# Patient Record
Sex: MALE | Race: WHITE | Employment: FULL TIME
[De-identification: names, ages, dates, MRNs, and addresses within clinical notes are randomized per-mention and may not be internally consistent; named-entity substitution may affect disease eponyms.]

---

## 2017-06-13 ENCOUNTER — TELEPHONE (OUTPATIENT)
Dept: OTHER | Facility: CLINIC | Age: 52
End: 2017-06-13

## 2018-06-07 ENCOUNTER — OFFICE VISIT (OUTPATIENT)
Dept: FAMILY MEDICINE CLINIC | Age: 53
End: 2018-06-07

## 2018-06-07 ENCOUNTER — TELEPHONE (OUTPATIENT)
Dept: FAMILY MEDICINE CLINIC | Age: 53
End: 2018-06-07

## 2018-06-07 VITALS
DIASTOLIC BLOOD PRESSURE: 82 MMHG | SYSTOLIC BLOOD PRESSURE: 120 MMHG | OXYGEN SATURATION: 98 % | WEIGHT: 241.4 LBS | BODY MASS INDEX: 33.8 KG/M2 | HEIGHT: 71 IN | HEART RATE: 88 BPM

## 2018-06-07 DIAGNOSIS — R07.9 CHEST PAIN, UNSPECIFIED TYPE: Primary | ICD-10-CM

## 2018-06-07 DIAGNOSIS — Z00.00 WELLNESS EXAMINATION: ICD-10-CM

## 2018-06-07 DIAGNOSIS — G47.33 OSA ON CPAP: ICD-10-CM

## 2018-06-07 DIAGNOSIS — Z99.89 OSA ON CPAP: ICD-10-CM

## 2018-06-07 DIAGNOSIS — R42 DIZZINESS: ICD-10-CM

## 2018-06-07 DIAGNOSIS — E78.00 ELEVATED LDL CHOLESTEROL LEVEL: Primary | ICD-10-CM

## 2018-06-07 PROCEDURE — 99213 OFFICE O/P EST LOW 20 MIN: CPT | Performed by: FAMILY MEDICINE

## 2018-06-07 ASSESSMENT — ENCOUNTER SYMPTOMS
DIARRHEA: 0
SHORTNESS OF BREATH: 0
ABDOMINAL PAIN: 0
CHEST TIGHTNESS: 0
BLOOD IN STOOL: 0
CONSTIPATION: 0
COLOR CHANGE: 0

## 2018-06-07 ASSESSMENT — PATIENT HEALTH QUESTIONNAIRE - PHQ9
SUM OF ALL RESPONSES TO PHQ9 QUESTIONS 1 & 2: 0
SUM OF ALL RESPONSES TO PHQ QUESTIONS 1-9: 0
1. LITTLE INTEREST OR PLEASURE IN DOING THINGS: 0
2. FEELING DOWN, DEPRESSED OR HOPELESS: 0

## 2018-06-21 ENCOUNTER — HOSPITAL ENCOUNTER (OUTPATIENT)
Dept: NON INVASIVE DIAGNOSTICS | Age: 53
Discharge: HOME | End: 2018-06-21
Attending: FAMILY MEDICINE | Admitting: FAMILY MEDICINE

## 2018-06-21 ENCOUNTER — HOSPITAL ENCOUNTER (OUTPATIENT)
Dept: OTHER | Age: 53
Discharge: HOME | End: 2018-06-21
Attending: FAMILY MEDICINE | Admitting: FAMILY MEDICINE

## 2018-06-21 VITALS
DIASTOLIC BLOOD PRESSURE: 70 MMHG | WEIGHT: 245 LBS | HEIGHT: 71 IN | BODY MASS INDEX: 34.3 KG/M2 | SYSTOLIC BLOOD PRESSURE: 110 MMHG | HEART RATE: 74 BPM

## 2018-06-21 DIAGNOSIS — E78.00 ELEVATED LDL CHOLESTEROL LEVEL: ICD-10-CM

## 2018-06-21 DIAGNOSIS — Z00.00 WELLNESS EXAMINATION: ICD-10-CM

## 2018-06-21 DIAGNOSIS — R07.9 CHEST PAIN: ICD-10-CM

## 2018-06-21 LAB
A/G RATIO: 1.4 (ref 1.1–2.2)
ALBUMIN SERPL-MCNC: 4.2 G/DL (ref 3.4–5)
ALP BLD-CCNC: 42 U/L (ref 40–129)
ALT SERPL-CCNC: 17 U/L (ref 10–40)
ANION GAP SERPL CALCULATED.3IONS-SCNC: 10 MMOL/L (ref 3–16)
AST SERPL-CCNC: 15 U/L (ref 15–37)
BILIRUB SERPL-MCNC: 0.7 MG/DL (ref 0–1)
BUN BLDV-MCNC: 12 MG/DL (ref 7–20)
CALCIUM SERPL-MCNC: 9 MG/DL (ref 8.3–10.6)
CHLORIDE BLD-SCNC: 105 MMOL/L (ref 99–110)
CHOLESTEROL, TOTAL: 153 MG/DL (ref 0–199)
CO2: 27 MMOL/L (ref 21–32)
CREAT SERPL-MCNC: 0.9 MG/DL (ref 0.9–1.3)
GFR AFRICAN AMERICAN: >60
GFR NON-AFRICAN AMERICAN: >60
GLOBULIN: 2.9 G/DL
GLUCOSE BLD-MCNC: 97 MG/DL (ref 70–99)
HDLC SERPL-MCNC: 41 MG/DL (ref 40–60)
LDL CHOLESTEROL CALCULATED: 97 MG/DL
LV EF: 76 %
LVEF MODALITY: NORMAL
POTASSIUM SERPL-SCNC: 4.5 MMOL/L (ref 3.5–5.1)
PROSTATE SPECIFIC ANTIGEN: 0.53 NG/ML (ref 0–4)
SODIUM BLD-SCNC: 142 MMOL/L (ref 136–145)
TOTAL PROTEIN: 7.1 G/DL (ref 6.4–8.2)
TRIGL SERPL-MCNC: 76 MG/DL (ref 0–150)
VLDLC SERPL CALC-MCNC: 15 MG/DL

## 2018-06-21 ASSESSMENT — PAIN - FUNCTIONAL ASSESSMENT: PAIN_FUNCTIONAL_ASSESSMENT: 0-10

## 2018-06-22 DIAGNOSIS — R94.39 ABNORMAL STRESS TEST: Primary | ICD-10-CM

## 2018-06-27 ENCOUNTER — OFFICE VISIT (OUTPATIENT)
Dept: FAMILY MEDICINE CLINIC | Age: 53
End: 2018-06-27

## 2018-06-27 VITALS
HEIGHT: 71 IN | HEART RATE: 90 BPM | DIASTOLIC BLOOD PRESSURE: 84 MMHG | BODY MASS INDEX: 33.77 KG/M2 | SYSTOLIC BLOOD PRESSURE: 122 MMHG | OXYGEN SATURATION: 98 % | WEIGHT: 241.2 LBS

## 2018-06-27 DIAGNOSIS — Z00.00 WELLNESS EXAMINATION: Primary | ICD-10-CM

## 2018-06-27 DIAGNOSIS — Z12.11 ENCOUNTER FOR FIT (FECAL IMMUNOCHEMICAL TEST) SCREENING: ICD-10-CM

## 2018-06-27 PROCEDURE — 99396 PREV VISIT EST AGE 40-64: CPT | Performed by: FAMILY MEDICINE

## 2018-06-27 ASSESSMENT — ENCOUNTER SYMPTOMS
COLOR CHANGE: 0
CONSTIPATION: 0
ABDOMINAL PAIN: 0
CHEST TIGHTNESS: 0
SHORTNESS OF BREATH: 0
BLOOD IN STOOL: 0
DIARRHEA: 0

## 2018-07-20 ENCOUNTER — TELEPHONE (OUTPATIENT)
Dept: FAMILY MEDICINE CLINIC | Age: 53
End: 2018-07-20

## 2018-07-20 RX ORDER — FLUCONAZOLE 150 MG/1
TABLET ORAL
Qty: 3 TABLET | Refills: 0 | Status: SHIPPED | OUTPATIENT
Start: 2018-07-20 | End: 2019-01-07 | Stop reason: ALTCHOICE

## 2019-01-07 ENCOUNTER — OFFICE VISIT (OUTPATIENT)
Dept: FAMILY MEDICINE CLINIC | Age: 54
End: 2019-01-07
Payer: COMMERCIAL

## 2019-01-07 VITALS
DIASTOLIC BLOOD PRESSURE: 80 MMHG | SYSTOLIC BLOOD PRESSURE: 110 MMHG | BODY MASS INDEX: 33.46 KG/M2 | HEIGHT: 71 IN | OXYGEN SATURATION: 98 % | WEIGHT: 239 LBS | HEART RATE: 72 BPM

## 2019-01-07 DIAGNOSIS — H92.01 RIGHT EAR PAIN: Primary | ICD-10-CM

## 2019-01-07 DIAGNOSIS — R42 DIZZINESS: ICD-10-CM

## 2019-01-07 PROCEDURE — 99214 OFFICE O/P EST MOD 30 MIN: CPT | Performed by: FAMILY MEDICINE

## 2019-01-07 RX ORDER — AMOXICILLIN 875 MG/1
875 TABLET, COATED ORAL 2 TIMES DAILY
Qty: 20 TABLET | Refills: 0 | Status: SHIPPED | OUTPATIENT
Start: 2019-01-07 | End: 2020-01-16 | Stop reason: SDUPTHER

## 2019-01-07 ASSESSMENT — ENCOUNTER SYMPTOMS
SINUS PRESSURE: 0
WHEEZING: 0
SORE THROAT: 0
COUGH: 0
VOMITING: 0
RHINORRHEA: 0
NAUSEA: 0
DIARRHEA: 0
SHORTNESS OF BREATH: 0

## 2019-04-18 ENCOUNTER — OFFICE VISIT (OUTPATIENT)
Dept: FAMILY MEDICINE CLINIC | Age: 54
End: 2019-04-18
Payer: COMMERCIAL

## 2019-04-18 VITALS
OXYGEN SATURATION: 96 % | HEIGHT: 71 IN | HEART RATE: 68 BPM | WEIGHT: 238.4 LBS | DIASTOLIC BLOOD PRESSURE: 96 MMHG | SYSTOLIC BLOOD PRESSURE: 148 MMHG | BODY MASS INDEX: 33.38 KG/M2

## 2019-04-18 DIAGNOSIS — R03.0 ELEVATED BLOOD PRESSURE READING: Primary | ICD-10-CM

## 2019-04-18 DIAGNOSIS — G47.33 OSA ON CPAP: ICD-10-CM

## 2019-04-18 DIAGNOSIS — R53.83 OTHER FATIGUE: ICD-10-CM

## 2019-04-18 DIAGNOSIS — Z99.89 OSA ON CPAP: ICD-10-CM

## 2019-04-18 PROCEDURE — 99214 OFFICE O/P EST MOD 30 MIN: CPT | Performed by: FAMILY MEDICINE

## 2019-04-18 RX ORDER — SILDENAFIL CITRATE 20 MG/1
100 TABLET ORAL DAILY PRN
Qty: 30 TABLET | Refills: 0 | Status: ON HOLD | OUTPATIENT
Start: 2019-04-18 | End: 2019-09-28

## 2019-04-18 ASSESSMENT — ENCOUNTER SYMPTOMS
CHEST TIGHTNESS: 0
CONSTIPATION: 0
DIARRHEA: 0
NAUSEA: 1
BLOOD IN STOOL: 0
SHORTNESS OF BREATH: 0

## 2019-04-18 NOTE — PROGRESS NOTES
Chief Complaint   Patient presents with    Fatigue    Pressure Behind the Eyes       HPI:  Asya Ley is a 47 y.o. (: 1965) here today   for   HPI  Ear ache for the last month on right side, now in left side. Has tried amoxicillin w/o much relief. Pressure in his head and increased fatigue. Has some shaking and face has been getting extremely red. Does not get much sleep, gets up at 4am and doesn't go to bed until 10 or so. Does not eat good food choices, he thinks possibly his sugar or bp. Had episode years ago of his blood sugar being down to 50, has also been on bp meds   Ear sxs off and on approx 1 mo. Some improvement w/ covering it. Worse over past few days. Has been using cpap, but overall getting less sleep. Feels cpap working well. Feels worse if does not use it. After eating, often feels he needs to [facility]. \"    Typically eats 4 donuts in the am.  approx 9 am, will eat a snack (star crunch, biscuit w/ tenderloin today). Trying to minimize soda. turkey, cheese later. Tried to avoid carbs this afternoon. Felt a little better. Ate some chips, then salami, then more chips this afternoon. Felt better this afternoon. Will occas need to eat something if shaky. Sig family hx of dm. Has been under sig amt of stress. Patient's medications, allergies, past medical, surgical, social and family histories were reviewed and updated as appropriate. ROS:  Review of Systems   Constitutional: Positive for appetite change and fatigue. Negative for chills and fever. HENT: Positive for ear pain. Respiratory: Negative for chest tightness and shortness of breath. Cardiovascular: Negative for chest pain and palpitations. Gastrointestinal: Positive for nausea. Negative for blood in stool, constipation and diarrhea. Neurological: Positive for dizziness, weakness, light-headedness and headaches.            Prior to Visit Medications    Not on File       No Known

## 2019-04-18 NOTE — PATIENT INSTRUCTIONS
Hypertension Lifestyle Interventions - What Good Does It Do? Intervention Systolic BP Reduction Diastolic BP Reduction        DASH Diet Plus Sodium Restriction 11.5 5.7   Sodium Restriction (<1500mg/day) 7 3   DASH Diet 5.5 3   Weight Loss (Approx.  9 lbs.) 4.5 3.2   Exercise (150 min/wk) 4 3   Restrictions of Alcohol Consumption 3 2

## 2019-04-20 LAB
A/G RATIO: 1 (ref 1–2.5)
ALBUMIN SERPL-MCNC: 3.8 G/DL (ref 3.6–5)
ALP BLD-CCNC: 49 U/L (ref 46–116)
ALT SERPL-CCNC: 27 U/L (ref 12–78)
ANION GAP SERPL CALCULATED.3IONS-SCNC: 10.7 MMOL/L (ref 8–16)
AST SERPL-CCNC: 17 U/L (ref 12–36)
BASOPHILS RELATIVE PERCENT: 0.9 % (ref 0–1)
BILIRUB SERPL-MCNC: 0.7 MG/DL (ref 0–1.1)
BUN BLDV-MCNC: 15 MG/DL (ref 5–19)
CALCIUM SERPL-MCNC: 8.8 MG/DL (ref 8.4–10.2)
CHLORIDE BLD-SCNC: 106 MMOL/L (ref 99–111)
CO2: 30 MMOL/L (ref 21–33)
CREAT SERPL-MCNC: 1.1 MG/DL (ref 0.6–1.3)
EOSINOPHILS RELATIVE PERCENT: 0.9 % (ref 0–5)
ERYTHROCYTE DISTRIBUTION WIDTH RBC RATIO: 13.3 % (ref 11.6–14.8)
GFR CALCULATED: 70
GLOBULIN: 3.7 G/DL (ref 2–3.5)
GLUCOSE BLD-MCNC: 96 MG/DL (ref 74–99)
GRANULOCYTE ABSOLUTE COUNT: 4.4 X(10)3/UL (ref 1.8–7.2)
HCT VFR BLD CALC: 46.9 % (ref 37.4–53.8)
HEMOGLOBIN: 15.8 G/DL (ref 13.2–16.5)
IMMATURE GRANULOCYTES %: 0.3 % (ref 0–0.5)
LYMPHOCYTES ABSOLUTE: 2 X(10)3/UL (ref 1.1–2.7)
LYMPHOCYTES RELATIVE PERCENT: 28.5 % (ref 15–45)
MCH RBC QN AUTO: 30.5 PG (ref 27.7–33.3)
MCHC RBC AUTO-ENTMCNC: 33.7 G/DL (ref 32.8–35)
MCV RBC AUTO: 90.5 FL (ref 80.5–96.9)
MONOCYTES RELATIVE PERCENT: 6.7 % (ref 0–12)
NEUTROPHILS/100 LEUKOCYTES: 62.7 % (ref 40–70)
PLATELETS: 188 X1000 (ref 129–332)
POTASSIUM SERPL-SCNC: 4.3 MMOL/L (ref 3.4–5)
RBC # BLD: 5.18 X1000000 (ref 4.16–5.62)
SODIUM BLD-SCNC: 142 MMOL/L (ref 136–146)
TOTAL PROTEIN: 7.5 G/DL (ref 6.3–8)
VITAMIN B-12: 571 PG/ML (ref 193–986)
WBC # BLD: 7 X1000 (ref 5.4–9.9)

## 2019-04-22 LAB
ESTIMATED AVERAGE GLUCOSE: 114 MG/DL (ref 74–99)
HBA1C MFR BLD: 5.6 % (ref 4.3–6.1)
T4 FREE: 0.93 NG/DL (ref 0.75–1.54)
TSH, 3RD GENERATION: 1.18 UIU/ML (ref 0.6–4.8)

## 2019-09-28 ENCOUNTER — APPOINTMENT (OUTPATIENT)
Dept: CT IMAGING | Age: 54
DRG: 440 | End: 2019-09-28
Payer: COMMERCIAL

## 2019-09-28 ENCOUNTER — APPOINTMENT (OUTPATIENT)
Dept: ULTRASOUND IMAGING | Age: 54
DRG: 440 | End: 2019-09-28
Payer: COMMERCIAL

## 2019-09-28 ENCOUNTER — HOSPITAL ENCOUNTER (OUTPATIENT)
Age: 54
Setting detail: OBSERVATION
Discharge: HOME | DRG: 440 | End: 2019-09-29
Attending: EMERGENCY MEDICINE | Admitting: INTERNAL MEDICINE
Payer: COMMERCIAL

## 2019-09-28 DIAGNOSIS — K85.00 IDIOPATHIC ACUTE PANCREATITIS WITHOUT INFECTION OR NECROSIS: Primary | ICD-10-CM

## 2019-09-28 DIAGNOSIS — K85.10 GALL STONE PANCREATITIS: ICD-10-CM

## 2019-09-28 PROBLEM — K85.90 ACUTE PANCREATITIS: Status: ACTIVE | Noted: 2019-09-28

## 2019-09-28 LAB
A/G RATIO: 1.2 (ref 1.1–2.2)
ALBUMIN SERPL-MCNC: 4.2 G/DL (ref 3.4–5)
ALP BLD-CCNC: 44 U/L (ref 40–129)
ALT SERPL-CCNC: 12 U/L (ref 10–40)
ANION GAP SERPL CALCULATED.3IONS-SCNC: 14 MMOL/L (ref 3–16)
AST SERPL-CCNC: 15 U/L (ref 15–37)
BASOPHILS ABSOLUTE: 0 K/UL (ref 0–0.2)
BASOPHILS RELATIVE PERCENT: 0.3 %
BILIRUB SERPL-MCNC: 1.2 MG/DL (ref 0–1)
BILIRUBIN URINE: NEGATIVE
BLOOD, URINE: ABNORMAL
BUN BLDV-MCNC: 9 MG/DL (ref 7–20)
CALCIUM SERPL-MCNC: 9.5 MG/DL (ref 8.3–10.6)
CHLORIDE BLD-SCNC: 102 MMOL/L (ref 99–110)
CLARITY: CLEAR
CO2: 23 MMOL/L (ref 21–32)
COLOR: YELLOW
CREAT SERPL-MCNC: 0.9 MG/DL (ref 0.9–1.3)
EOSINOPHILS ABSOLUTE: 0 K/UL (ref 0–0.6)
EOSINOPHILS RELATIVE PERCENT: 0.3 %
GFR AFRICAN AMERICAN: >60
GFR NON-AFRICAN AMERICAN: >60
GLOBULIN: 3.5 G/DL
GLUCOSE BLD-MCNC: 89 MG/DL (ref 70–99)
GLUCOSE URINE: NEGATIVE MG/DL
HCT VFR BLD CALC: 46.6 % (ref 40.5–52.5)
HEMOGLOBIN: 15.6 G/DL (ref 13.5–17.5)
KETONES, URINE: 15 MG/DL
LEUKOCYTE ESTERASE, URINE: NEGATIVE
LIPASE: 726 U/L (ref 13–60)
LYMPHOCYTES ABSOLUTE: 2 K/UL (ref 1–5.1)
LYMPHOCYTES RELATIVE PERCENT: 16.6 %
MCH RBC QN AUTO: 30.1 PG (ref 26–34)
MCHC RBC AUTO-ENTMCNC: 33.5 G/DL (ref 31–36)
MCV RBC AUTO: 89.6 FL (ref 80–100)
MICROSCOPIC EXAMINATION: YES
MONOCYTES ABSOLUTE: 0.8 K/UL (ref 0–1.3)
MONOCYTES RELATIVE PERCENT: 6.9 %
NEUTROPHILS ABSOLUTE: 9.4 K/UL (ref 1.7–7.7)
NEUTROPHILS RELATIVE PERCENT: 75.9 %
NITRITE, URINE: NEGATIVE
PDW BLD-RTO: 14 % (ref 12.4–15.4)
PH UA: 7.5 (ref 5–8)
PLATELET # BLD: 166 K/UL (ref 135–450)
PMV BLD AUTO: 9 FL (ref 5–10.5)
POTASSIUM SERPL-SCNC: 4.1 MMOL/L (ref 3.5–5.1)
PROTEIN UA: NEGATIVE MG/DL
RBC # BLD: 5.2 M/UL (ref 4.2–5.9)
RBC UA: NORMAL /HPF (ref 0–2)
SODIUM BLD-SCNC: 139 MMOL/L (ref 136–145)
SPECIFIC GRAVITY UA: 1.01 (ref 1–1.03)
TOTAL PROTEIN: 7.7 G/DL (ref 6.4–8.2)
URINE TYPE: ABNORMAL
UROBILINOGEN, URINE: 0.2 E.U./DL
WBC # BLD: 12.3 K/UL (ref 4–11)
WBC UA: NORMAL /HPF (ref 0–5)

## 2019-09-28 PROCEDURE — 6360000002 HC RX W HCPCS: Performed by: PHYSICIAN ASSISTANT

## 2019-09-28 PROCEDURE — 99285 EMERGENCY DEPT VISIT HI MDM: CPT

## 2019-09-28 PROCEDURE — 2500000003 HC RX 250 WO HCPCS: Performed by: EMERGENCY MEDICINE

## 2019-09-28 PROCEDURE — 2580000003 HC RX 258: Performed by: EMERGENCY MEDICINE

## 2019-09-28 PROCEDURE — 6360000002 HC RX W HCPCS: Performed by: EMERGENCY MEDICINE

## 2019-09-28 PROCEDURE — 6360000004 HC RX CONTRAST MEDICATION: Performed by: EMERGENCY MEDICINE

## 2019-09-28 PROCEDURE — G0378 HOSPITAL OBSERVATION PER HR: HCPCS

## 2019-09-28 PROCEDURE — 81001 URINALYSIS AUTO W/SCOPE: CPT

## 2019-09-28 PROCEDURE — 74177 CT ABD & PELVIS W/CONTRAST: CPT

## 2019-09-28 PROCEDURE — 36415 COLL VENOUS BLD VENIPUNCTURE: CPT

## 2019-09-28 PROCEDURE — 85025 COMPLETE CBC W/AUTO DIFF WBC: CPT

## 2019-09-28 PROCEDURE — 96375 TX/PRO/DX INJ NEW DRUG ADDON: CPT

## 2019-09-28 PROCEDURE — 76705 ECHO EXAM OF ABDOMEN: CPT

## 2019-09-28 PROCEDURE — 2580000003 HC RX 258: Performed by: PHYSICIAN ASSISTANT

## 2019-09-28 PROCEDURE — 96374 THER/PROPH/DIAG INJ IV PUSH: CPT

## 2019-09-28 PROCEDURE — 96372 THER/PROPH/DIAG INJ SC/IM: CPT

## 2019-09-28 PROCEDURE — 83690 ASSAY OF LIPASE: CPT

## 2019-09-28 PROCEDURE — 80053 COMPREHEN METABOLIC PANEL: CPT

## 2019-09-28 PROCEDURE — 99222 1ST HOSP IP/OBS MODERATE 55: CPT | Performed by: PHYSICIAN ASSISTANT

## 2019-09-28 PROCEDURE — 1200000000 HC SEMI PRIVATE

## 2019-09-28 RX ORDER — MORPHINE SULFATE 4 MG/ML
4 INJECTION, SOLUTION INTRAMUSCULAR; INTRAVENOUS
Status: DISCONTINUED | OUTPATIENT
Start: 2019-09-28 | End: 2019-09-29 | Stop reason: HOSPADM

## 2019-09-28 RX ORDER — SODIUM CHLORIDE 9 MG/ML
INJECTION, SOLUTION INTRAVENOUS CONTINUOUS
Status: DISCONTINUED | OUTPATIENT
Start: 2019-09-28 | End: 2019-09-29

## 2019-09-28 RX ORDER — ONDANSETRON 2 MG/ML
4 INJECTION INTRAMUSCULAR; INTRAVENOUS ONCE
Status: COMPLETED | OUTPATIENT
Start: 2019-09-28 | End: 2019-09-28

## 2019-09-28 RX ORDER — 0.9 % SODIUM CHLORIDE 0.9 %
1000 INTRAVENOUS SOLUTION INTRAVENOUS ONCE
Status: COMPLETED | OUTPATIENT
Start: 2019-09-28 | End: 2019-09-28

## 2019-09-28 RX ORDER — ONDANSETRON 2 MG/ML
4 INJECTION INTRAMUSCULAR; INTRAVENOUS EVERY 6 HOURS PRN
Status: DISCONTINUED | OUTPATIENT
Start: 2019-09-28 | End: 2019-09-29 | Stop reason: HOSPADM

## 2019-09-28 RX ORDER — SODIUM CHLORIDE 0.9 % (FLUSH) 0.9 %
10 SYRINGE (ML) INJECTION PRN
Status: DISCONTINUED | OUTPATIENT
Start: 2019-09-28 | End: 2019-09-29 | Stop reason: HOSPADM

## 2019-09-28 RX ORDER — SODIUM CHLORIDE 0.9 % (FLUSH) 0.9 %
10 SYRINGE (ML) INJECTION EVERY 12 HOURS SCHEDULED
Status: DISCONTINUED | OUTPATIENT
Start: 2019-09-28 | End: 2019-09-29 | Stop reason: HOSPADM

## 2019-09-28 RX ORDER — MORPHINE SULFATE 2 MG/ML
2 INJECTION, SOLUTION INTRAMUSCULAR; INTRAVENOUS
Status: DISCONTINUED | OUTPATIENT
Start: 2019-09-28 | End: 2019-09-29 | Stop reason: HOSPADM

## 2019-09-28 RX ORDER — ACETAMINOPHEN 325 MG/1
650 TABLET ORAL EVERY 4 HOURS PRN
Status: DISCONTINUED | OUTPATIENT
Start: 2019-09-28 | End: 2019-09-29 | Stop reason: HOSPADM

## 2019-09-28 RX ADMIN — SODIUM CHLORIDE: 9 INJECTION, SOLUTION INTRAVENOUS at 20:14

## 2019-09-28 RX ADMIN — SODIUM CHLORIDE: 9 INJECTION, SOLUTION INTRAVENOUS at 14:53

## 2019-09-28 RX ADMIN — SODIUM CHLORIDE 1000 ML: 9 INJECTION, SOLUTION INTRAVENOUS at 11:38

## 2019-09-28 RX ADMIN — ONDANSETRON 4 MG: 2 INJECTION INTRAMUSCULAR; INTRAVENOUS at 11:39

## 2019-09-28 RX ADMIN — SODIUM CHLORIDE: 9 INJECTION, SOLUTION INTRAVENOUS at 20:12

## 2019-09-28 RX ADMIN — FAMOTIDINE 20 MG: 10 INJECTION, SOLUTION INTRAVENOUS at 11:39

## 2019-09-28 RX ADMIN — Medication 10 ML: at 20:13

## 2019-09-28 RX ADMIN — ENOXAPARIN SODIUM 40 MG: 40 INJECTION SUBCUTANEOUS at 20:13

## 2019-09-28 RX ADMIN — IOPAMIDOL 75 ML: 755 INJECTION, SOLUTION INTRAVENOUS at 12:03

## 2019-09-28 ASSESSMENT — PAIN DESCRIPTION - DESCRIPTORS
DESCRIPTORS: CRAMPING
DESCRIPTORS: ACHING;DISCOMFORT

## 2019-09-28 ASSESSMENT — PAIN SCALES - GENERAL
PAINLEVEL_OUTOF10: 2
PAINLEVEL_OUTOF10: 2

## 2019-09-28 ASSESSMENT — ENCOUNTER SYMPTOMS
COUGH: 0
STRIDOR: 0
ABDOMINAL PAIN: 1
RECTAL PAIN: 0
CHEST TIGHTNESS: 0
BACK PAIN: 0
CONSTIPATION: 0
BLOOD IN STOOL: 0
ABDOMINAL DISTENTION: 0
DIARRHEA: 0
VOMITING: 0
WHEEZING: 0
NAUSEA: 0
ANAL BLEEDING: 0
SHORTNESS OF BREATH: 0

## 2019-09-28 ASSESSMENT — PAIN DESCRIPTION - ONSET
ONSET: ON-GOING
ONSET: ON-GOING

## 2019-09-28 ASSESSMENT — PAIN - FUNCTIONAL ASSESSMENT: PAIN_FUNCTIONAL_ASSESSMENT: ACTIVITIES ARE NOT PREVENTED

## 2019-09-28 ASSESSMENT — PAIN DESCRIPTION - PAIN TYPE
TYPE: ACUTE PAIN
TYPE: ACUTE PAIN

## 2019-09-28 ASSESSMENT — PAIN DESCRIPTION - PROGRESSION
CLINICAL_PROGRESSION: GRADUALLY IMPROVING
CLINICAL_PROGRESSION: NOT CHANGED

## 2019-09-28 ASSESSMENT — PAIN DESCRIPTION - FREQUENCY
FREQUENCY: CONTINUOUS
FREQUENCY: INTERMITTENT

## 2019-09-28 ASSESSMENT — PAIN DESCRIPTION - LOCATION
LOCATION: ABDOMEN
LOCATION: ABDOMEN

## 2019-09-28 ASSESSMENT — PAIN DESCRIPTION - ORIENTATION
ORIENTATION: LOWER
ORIENTATION: MID;LOWER

## 2019-09-29 VITALS
RESPIRATION RATE: 16 BRPM | HEART RATE: 77 BPM | TEMPERATURE: 97 F | OXYGEN SATURATION: 99 % | DIASTOLIC BLOOD PRESSURE: 61 MMHG | SYSTOLIC BLOOD PRESSURE: 104 MMHG | BODY MASS INDEX: 33.76 KG/M2 | HEIGHT: 70 IN | WEIGHT: 235.8 LBS

## 2019-09-29 LAB
A/G RATIO: 1.2 (ref 1.1–2.2)
ALBUMIN SERPL-MCNC: 3.8 G/DL (ref 3.4–5)
ALP BLD-CCNC: 42 U/L (ref 40–129)
ALT SERPL-CCNC: 11 U/L (ref 10–40)
ANION GAP SERPL CALCULATED.3IONS-SCNC: 11 MMOL/L (ref 3–16)
AST SERPL-CCNC: 13 U/L (ref 15–37)
BASOPHILS ABSOLUTE: 0 K/UL (ref 0–0.2)
BASOPHILS RELATIVE PERCENT: 0.4 %
BILIRUB SERPL-MCNC: 1.6 MG/DL (ref 0–1)
BUN BLDV-MCNC: 10 MG/DL (ref 7–20)
CALCIUM SERPL-MCNC: 8.7 MG/DL (ref 8.3–10.6)
CHLORIDE BLD-SCNC: 100 MMOL/L (ref 99–110)
CHOLESTEROL, TOTAL: 142 MG/DL (ref 0–199)
CO2: 23 MMOL/L (ref 21–32)
CREAT SERPL-MCNC: 0.8 MG/DL (ref 0.9–1.3)
EOSINOPHILS ABSOLUTE: 0.1 K/UL (ref 0–0.6)
EOSINOPHILS RELATIVE PERCENT: 0.7 %
GFR AFRICAN AMERICAN: >60
GFR NON-AFRICAN AMERICAN: >60
GLOBULIN: 3.3 G/DL
GLUCOSE BLD-MCNC: 81 MG/DL (ref 70–99)
HCT VFR BLD CALC: 43.5 % (ref 40.5–52.5)
HDLC SERPL-MCNC: 43 MG/DL (ref 40–60)
HEMOGLOBIN: 14.7 G/DL (ref 13.5–17.5)
LDL CHOLESTEROL CALCULATED: 85 MG/DL
LYMPHOCYTES ABSOLUTE: 2 K/UL (ref 1–5.1)
LYMPHOCYTES RELATIVE PERCENT: 18.4 %
MCH RBC QN AUTO: 30.6 PG (ref 26–34)
MCHC RBC AUTO-ENTMCNC: 33.9 G/DL (ref 31–36)
MCV RBC AUTO: 90.4 FL (ref 80–100)
MONOCYTES ABSOLUTE: 0.8 K/UL (ref 0–1.3)
MONOCYTES RELATIVE PERCENT: 7.1 %
NEUTROPHILS ABSOLUTE: 7.8 K/UL (ref 1.7–7.7)
NEUTROPHILS RELATIVE PERCENT: 73.4 %
PDW BLD-RTO: 13.9 % (ref 12.4–15.4)
PLATELET # BLD: 158 K/UL (ref 135–450)
PMV BLD AUTO: 9.4 FL (ref 5–10.5)
POTASSIUM REFLEX MAGNESIUM: 4 MMOL/L (ref 3.5–5.1)
RBC # BLD: 4.81 M/UL (ref 4.2–5.9)
SODIUM BLD-SCNC: 134 MMOL/L (ref 136–145)
TOTAL PROTEIN: 7.1 G/DL (ref 6.4–8.2)
TRIGL SERPL-MCNC: 68 MG/DL (ref 0–150)
VLDLC SERPL CALC-MCNC: 14 MG/DL
WBC # BLD: 10.7 K/UL (ref 4–11)

## 2019-09-29 PROCEDURE — 80053 COMPREHEN METABOLIC PANEL: CPT

## 2019-09-29 PROCEDURE — 6360000002 HC RX W HCPCS: Performed by: PHYSICIAN ASSISTANT

## 2019-09-29 PROCEDURE — 96372 THER/PROPH/DIAG INJ SC/IM: CPT

## 2019-09-29 PROCEDURE — 99238 HOSP IP/OBS DSCHRG MGMT 30/<: CPT | Performed by: INTERNAL MEDICINE

## 2019-09-29 PROCEDURE — G0378 HOSPITAL OBSERVATION PER HR: HCPCS

## 2019-09-29 PROCEDURE — 2580000003 HC RX 258: Performed by: PHYSICIAN ASSISTANT

## 2019-09-29 PROCEDURE — 80061 LIPID PANEL: CPT

## 2019-09-29 PROCEDURE — 36415 COLL VENOUS BLD VENIPUNCTURE: CPT

## 2019-09-29 PROCEDURE — 85025 COMPLETE CBC W/AUTO DIFF WBC: CPT

## 2019-09-29 RX ORDER — ONDANSETRON 4 MG/1
4 TABLET, ORALLY DISINTEGRATING ORAL EVERY 8 HOURS PRN
Qty: 20 TABLET | Refills: 0 | Status: SHIPPED | OUTPATIENT
Start: 2019-09-29 | End: 2019-10-04 | Stop reason: ALTCHOICE

## 2019-09-29 RX ADMIN — SODIUM CHLORIDE: 9 INJECTION, SOLUTION INTRAVENOUS at 09:23

## 2019-09-29 RX ADMIN — Medication 10 ML: at 09:21

## 2019-09-29 RX ADMIN — ENOXAPARIN SODIUM 40 MG: 40 INJECTION SUBCUTANEOUS at 09:20

## 2019-09-30 ENCOUNTER — TELEPHONE (OUTPATIENT)
Dept: FAMILY MEDICINE CLINIC | Age: 54
End: 2019-09-30

## 2019-10-02 ENCOUNTER — PREP FOR PROCEDURE (OUTPATIENT)
Dept: SURGERY | Age: 54
End: 2019-10-02

## 2019-10-02 ENCOUNTER — OFFICE VISIT (OUTPATIENT)
Dept: FAMILY MEDICINE CLINIC | Age: 54
End: 2019-10-02
Payer: COMMERCIAL

## 2019-10-02 ENCOUNTER — OFFICE VISIT (OUTPATIENT)
Dept: SURGERY | Age: 54
End: 2019-10-02
Payer: COMMERCIAL

## 2019-10-02 VITALS
BODY MASS INDEX: 33.26 KG/M2 | SYSTOLIC BLOOD PRESSURE: 126 MMHG | DIASTOLIC BLOOD PRESSURE: 78 MMHG | OXYGEN SATURATION: 98 % | HEART RATE: 65 BPM | WEIGHT: 231.8 LBS

## 2019-10-02 VITALS
HEIGHT: 70 IN | DIASTOLIC BLOOD PRESSURE: 72 MMHG | BODY MASS INDEX: 33.64 KG/M2 | SYSTOLIC BLOOD PRESSURE: 126 MMHG | WEIGHT: 235 LBS

## 2019-10-02 DIAGNOSIS — K85.10 GALL STONE PANCREATITIS: Primary | ICD-10-CM

## 2019-10-02 DIAGNOSIS — Z12.11 SCREEN FOR COLON CANCER: ICD-10-CM

## 2019-10-02 DIAGNOSIS — K81.9 CHOLECYSTITIS: Primary | ICD-10-CM

## 2019-10-02 PROCEDURE — 99214 OFFICE O/P EST MOD 30 MIN: CPT | Performed by: SURGERY

## 2019-10-02 PROCEDURE — 99214 OFFICE O/P EST MOD 30 MIN: CPT | Performed by: NURSE PRACTITIONER

## 2019-10-02 PROCEDURE — 1111F DSCHRG MED/CURRENT MED MERGE: CPT | Performed by: NURSE PRACTITIONER

## 2019-10-02 RX ORDER — HEPARIN SODIUM 5000 [USP'U]/ML
5000 INJECTION, SOLUTION INTRAVENOUS; SUBCUTANEOUS ONCE
Status: CANCELLED | OUTPATIENT
Start: 2019-10-02

## 2019-10-02 RX ORDER — SODIUM CHLORIDE 0.9 % (FLUSH) 0.9 %
10 SYRINGE (ML) INJECTION EVERY 12 HOURS SCHEDULED
Status: CANCELLED | OUTPATIENT
Start: 2019-10-02

## 2019-10-02 RX ORDER — SODIUM CHLORIDE 0.9 % (FLUSH) 0.9 %
10 SYRINGE (ML) INJECTION PRN
Status: CANCELLED | OUTPATIENT
Start: 2019-10-02

## 2019-10-02 ASSESSMENT — ENCOUNTER SYMPTOMS
EYE PAIN: 0
EYE ITCHING: 0
STRIDOR: 0
VOMITING: 0
BACK PAIN: 0
PHOTOPHOBIA: 0
CHOKING: 0
COLOR CHANGE: 0
WHEEZING: 0
EYE REDNESS: 0
SHORTNESS OF BREATH: 0
SORE THROAT: 0
CONSTIPATION: 0
SINUS PRESSURE: 0
BLOOD IN STOOL: 0
DIARRHEA: 0
VOICE CHANGE: 0
CHEST TIGHTNESS: 0
COUGH: 0
NAUSEA: 0
RHINORRHEA: 0
TROUBLE SWALLOWING: 0
EYE DISCHARGE: 0
SINUS PAIN: 0
ABDOMINAL PAIN: 0

## 2019-10-02 ASSESSMENT — PATIENT HEALTH QUESTIONNAIRE - PHQ9
2. FEELING DOWN, DEPRESSED OR HOPELESS: 0
1. LITTLE INTEREST OR PLEASURE IN DOING THINGS: 0
SUM OF ALL RESPONSES TO PHQ QUESTIONS 1-9: 0
SUM OF ALL RESPONSES TO PHQ9 QUESTIONS 1 & 2: 0
SUM OF ALL RESPONSES TO PHQ QUESTIONS 1-9: 0

## 2019-10-08 ENCOUNTER — ANESTHESIA EVENT (OUTPATIENT)
Dept: OPERATING ROOM | Age: 54
End: 2019-10-08
Payer: COMMERCIAL

## 2019-10-09 ENCOUNTER — APPOINTMENT (OUTPATIENT)
Dept: GENERAL RADIOLOGY | Age: 54
End: 2019-10-09
Attending: SURGERY
Payer: COMMERCIAL

## 2019-10-09 ENCOUNTER — HOSPITAL ENCOUNTER (OUTPATIENT)
Age: 54
Setting detail: OUTPATIENT SURGERY
Discharge: HOME | End: 2019-10-09
Attending: SURGERY | Admitting: SURGERY
Payer: COMMERCIAL

## 2019-10-09 ENCOUNTER — ANESTHESIA (OUTPATIENT)
Dept: OPERATING ROOM | Age: 54
End: 2019-10-09
Payer: COMMERCIAL

## 2019-10-09 VITALS
OXYGEN SATURATION: 99 % | WEIGHT: 225 LBS | SYSTOLIC BLOOD PRESSURE: 116 MMHG | BODY MASS INDEX: 31.5 KG/M2 | DIASTOLIC BLOOD PRESSURE: 75 MMHG | RESPIRATION RATE: 11 BRPM | TEMPERATURE: 97.9 F | HEIGHT: 71 IN | HEART RATE: 65 BPM

## 2019-10-09 VITALS
OXYGEN SATURATION: 99 % | RESPIRATION RATE: 2 BRPM | SYSTOLIC BLOOD PRESSURE: 121 MMHG | DIASTOLIC BLOOD PRESSURE: 86 MMHG | TEMPERATURE: 97.7 F

## 2019-10-09 DIAGNOSIS — K85.10 GALLSTONE PANCREATITIS: Primary | ICD-10-CM

## 2019-10-09 LAB
ALBUMIN SERPL-MCNC: 4.2 G/DL (ref 3.4–5)
ALP BLD-CCNC: 45 U/L (ref 40–129)
ALT SERPL-CCNC: 16 U/L (ref 10–40)
AST SERPL-CCNC: 13 U/L (ref 15–37)
BILIRUB SERPL-MCNC: 0.6 MG/DL (ref 0–1)
BILIRUBIN DIRECT: <0.2 MG/DL (ref 0–0.3)
BILIRUBIN, INDIRECT: ABNORMAL MG/DL (ref 0–1)
TOTAL PROTEIN: 7.6 G/DL (ref 6.4–8.2)

## 2019-10-09 PROCEDURE — 93005 ELECTROCARDIOGRAM TRACING: CPT | Performed by: ANESTHESIOLOGY

## 2019-10-09 PROCEDURE — 47563 LAPARO CHOLECYSTECTOMY/GRAPH: CPT | Performed by: SURGERY

## 2019-10-09 PROCEDURE — 7100000001 HC PACU RECOVERY - ADDTL 15 MIN: Performed by: SURGERY

## 2019-10-09 PROCEDURE — 80076 HEPATIC FUNCTION PANEL: CPT

## 2019-10-09 PROCEDURE — 6360000002 HC RX W HCPCS: Performed by: ANESTHESIOLOGY

## 2019-10-09 PROCEDURE — 7100000010 HC PHASE II RECOVERY - FIRST 15 MIN: Performed by: SURGERY

## 2019-10-09 PROCEDURE — 88304 TISSUE EXAM BY PATHOLOGIST: CPT

## 2019-10-09 PROCEDURE — 6370000000 HC RX 637 (ALT 250 FOR IP): Performed by: ANESTHESIOLOGY

## 2019-10-09 PROCEDURE — 3600000014 HC SURGERY LEVEL 4 ADDTL 15MIN: Performed by: SURGERY

## 2019-10-09 PROCEDURE — 2580000003 HC RX 258: Performed by: SURGERY

## 2019-10-09 PROCEDURE — 6360000004 HC RX CONTRAST MEDICATION: Performed by: SURGERY

## 2019-10-09 PROCEDURE — 2500000003 HC RX 250 WO HCPCS: Performed by: NURSE ANESTHETIST, CERTIFIED REGISTERED

## 2019-10-09 PROCEDURE — 7100000011 HC PHASE II RECOVERY - ADDTL 15 MIN: Performed by: SURGERY

## 2019-10-09 PROCEDURE — 6360000002 HC RX W HCPCS: Performed by: SURGERY

## 2019-10-09 PROCEDURE — 2500000003 HC RX 250 WO HCPCS: Performed by: ANESTHESIOLOGY

## 2019-10-09 PROCEDURE — 2500000003 HC RX 250 WO HCPCS: Performed by: SURGERY

## 2019-10-09 PROCEDURE — 2720000010 HC SURG SUPPLY STERILE: Performed by: SURGERY

## 2019-10-09 PROCEDURE — 2580000003 HC RX 258: Performed by: ANESTHESIOLOGY

## 2019-10-09 PROCEDURE — 3600000004 HC SURGERY LEVEL 4 BASE: Performed by: SURGERY

## 2019-10-09 PROCEDURE — 6360000002 HC RX W HCPCS: Performed by: NURSE ANESTHETIST, CERTIFIED REGISTERED

## 2019-10-09 PROCEDURE — 2709999900 HC NON-CHARGEABLE SUPPLY: Performed by: SURGERY

## 2019-10-09 PROCEDURE — 3700000000 HC ANESTHESIA ATTENDED CARE: Performed by: SURGERY

## 2019-10-09 PROCEDURE — 36415 COLL VENOUS BLD VENIPUNCTURE: CPT

## 2019-10-09 PROCEDURE — 7100000000 HC PACU RECOVERY - FIRST 15 MIN: Performed by: SURGERY

## 2019-10-09 PROCEDURE — 3700000001 HC ADD 15 MINUTES (ANESTHESIA): Performed by: SURGERY

## 2019-10-09 PROCEDURE — 74300 X-RAY BILE DUCTS/PANCREAS: CPT

## 2019-10-09 RX ORDER — SODIUM CHLORIDE, SODIUM LACTATE, POTASSIUM CHLORIDE, AND CALCIUM CHLORIDE .6; .31; .03; .02 G/100ML; G/100ML; G/100ML; G/100ML
IRRIGANT IRRIGATION PRN
Status: DISCONTINUED | OUTPATIENT
Start: 2019-10-09 | End: 2019-10-09 | Stop reason: ALTCHOICE

## 2019-10-09 RX ORDER — FENTANYL CITRATE 50 UG/ML
INJECTION, SOLUTION INTRAMUSCULAR; INTRAVENOUS PRN
Status: DISCONTINUED | OUTPATIENT
Start: 2019-10-09 | End: 2019-10-09 | Stop reason: SDUPTHER

## 2019-10-09 RX ORDER — LABETALOL HYDROCHLORIDE 5 MG/ML
5 INJECTION, SOLUTION INTRAVENOUS EVERY 10 MIN PRN
Status: DISCONTINUED | OUTPATIENT
Start: 2019-10-09 | End: 2019-10-09 | Stop reason: HOSPADM

## 2019-10-09 RX ORDER — SODIUM CHLORIDE 0.9 % (FLUSH) 0.9 %
10 SYRINGE (ML) INJECTION PRN
Status: DISCONTINUED | OUTPATIENT
Start: 2019-10-09 | End: 2019-10-09 | Stop reason: SDUPTHER

## 2019-10-09 RX ORDER — HYDRALAZINE HYDROCHLORIDE 20 MG/ML
5 INJECTION INTRAMUSCULAR; INTRAVENOUS EVERY 10 MIN PRN
Status: DISCONTINUED | OUTPATIENT
Start: 2019-10-09 | End: 2019-10-09 | Stop reason: HOSPADM

## 2019-10-09 RX ORDER — BUPIVACAINE HYDROCHLORIDE 5 MG/ML
INJECTION, SOLUTION EPIDURAL; INTRACAUDAL PRN
Status: DISCONTINUED | OUTPATIENT
Start: 2019-10-09 | End: 2019-10-09 | Stop reason: ALTCHOICE

## 2019-10-09 RX ORDER — SODIUM CHLORIDE, SODIUM LACTATE, POTASSIUM CHLORIDE, CALCIUM CHLORIDE 600; 310; 30; 20 MG/100ML; MG/100ML; MG/100ML; MG/100ML
INJECTION, SOLUTION INTRAVENOUS CONTINUOUS
Status: DISCONTINUED | OUTPATIENT
Start: 2019-10-09 | End: 2019-10-09 | Stop reason: HOSPADM

## 2019-10-09 RX ORDER — DEXAMETHASONE SODIUM PHOSPHATE 4 MG/ML
INJECTION, SOLUTION INTRA-ARTICULAR; INTRALESIONAL; INTRAMUSCULAR; INTRAVENOUS; SOFT TISSUE PRN
Status: DISCONTINUED | OUTPATIENT
Start: 2019-10-09 | End: 2019-10-09 | Stop reason: SDUPTHER

## 2019-10-09 RX ORDER — APREPITANT 40 MG/1
40 CAPSULE ORAL ONCE
Status: COMPLETED | OUTPATIENT
Start: 2019-10-09 | End: 2019-10-09

## 2019-10-09 RX ORDER — DIPHENHYDRAMINE HYDROCHLORIDE 50 MG/ML
12.5 INJECTION INTRAMUSCULAR; INTRAVENOUS
Status: DISCONTINUED | OUTPATIENT
Start: 2019-10-09 | End: 2019-10-09 | Stop reason: HOSPADM

## 2019-10-09 RX ORDER — SODIUM CHLORIDE 0.9 % (FLUSH) 0.9 %
10 SYRINGE (ML) INJECTION PRN
Status: DISCONTINUED | OUTPATIENT
Start: 2019-10-09 | End: 2019-10-09 | Stop reason: HOSPADM

## 2019-10-09 RX ORDER — PROMETHAZINE HYDROCHLORIDE 25 MG/ML
6.25 INJECTION, SOLUTION INTRAMUSCULAR; INTRAVENOUS
Status: DISCONTINUED | OUTPATIENT
Start: 2019-10-09 | End: 2019-10-09 | Stop reason: HOSPADM

## 2019-10-09 RX ORDER — MORPHINE SULFATE 10 MG/ML
2 INJECTION, SOLUTION INTRAMUSCULAR; INTRAVENOUS EVERY 5 MIN PRN
Status: DISCONTINUED | OUTPATIENT
Start: 2019-10-09 | End: 2019-10-09 | Stop reason: HOSPADM

## 2019-10-09 RX ORDER — OXYCODONE HYDROCHLORIDE AND ACETAMINOPHEN 5; 325 MG/1; MG/1
2 TABLET ORAL PRN
Status: COMPLETED | OUTPATIENT
Start: 2019-10-09 | End: 2019-10-09

## 2019-10-09 RX ORDER — OXYCODONE HYDROCHLORIDE 5 MG/1
5 TABLET ORAL EVERY 6 HOURS PRN
Qty: 28 TABLET | Refills: 0 | Status: SHIPPED | OUTPATIENT
Start: 2019-10-09 | End: 2019-10-16

## 2019-10-09 RX ORDER — HEPARIN SODIUM 5000 [USP'U]/ML
5000 INJECTION, SOLUTION INTRAVENOUS; SUBCUTANEOUS ONCE
Status: COMPLETED | OUTPATIENT
Start: 2019-10-09 | End: 2019-10-09

## 2019-10-09 RX ORDER — SODIUM CHLORIDE 0.9 % (FLUSH) 0.9 %
10 SYRINGE (ML) INJECTION EVERY 12 HOURS SCHEDULED
Status: DISCONTINUED | OUTPATIENT
Start: 2019-10-09 | End: 2019-10-09 | Stop reason: SDUPTHER

## 2019-10-09 RX ORDER — LIDOCAINE HYDROCHLORIDE 10 MG/ML
1 INJECTION, SOLUTION EPIDURAL; INFILTRATION; INTRACAUDAL; PERINEURAL
Status: COMPLETED | OUTPATIENT
Start: 2019-10-09 | End: 2019-10-09

## 2019-10-09 RX ORDER — ONDANSETRON 2 MG/ML
INJECTION INTRAMUSCULAR; INTRAVENOUS PRN
Status: DISCONTINUED | OUTPATIENT
Start: 2019-10-09 | End: 2019-10-09 | Stop reason: SDUPTHER

## 2019-10-09 RX ORDER — LIDOCAINE HYDROCHLORIDE 20 MG/ML
INJECTION, SOLUTION INFILTRATION; PERINEURAL PRN
Status: DISCONTINUED | OUTPATIENT
Start: 2019-10-09 | End: 2019-10-09 | Stop reason: SDUPTHER

## 2019-10-09 RX ORDER — SUCCINYLCHOLINE CHLORIDE 20 MG/ML
INJECTION INTRAMUSCULAR; INTRAVENOUS PRN
Status: DISCONTINUED | OUTPATIENT
Start: 2019-10-09 | End: 2019-10-09 | Stop reason: SDUPTHER

## 2019-10-09 RX ORDER — ROCURONIUM BROMIDE 10 MG/ML
INJECTION, SOLUTION INTRAVENOUS PRN
Status: DISCONTINUED | OUTPATIENT
Start: 2019-10-09 | End: 2019-10-09 | Stop reason: SDUPTHER

## 2019-10-09 RX ORDER — MORPHINE SULFATE 10 MG/ML
1 INJECTION, SOLUTION INTRAMUSCULAR; INTRAVENOUS EVERY 5 MIN PRN
Status: DISCONTINUED | OUTPATIENT
Start: 2019-10-09 | End: 2019-10-09 | Stop reason: HOSPADM

## 2019-10-09 RX ORDER — MIDAZOLAM HYDROCHLORIDE 1 MG/ML
INJECTION INTRAMUSCULAR; INTRAVENOUS PRN
Status: DISCONTINUED | OUTPATIENT
Start: 2019-10-09 | End: 2019-10-09 | Stop reason: SDUPTHER

## 2019-10-09 RX ORDER — OXYCODONE HYDROCHLORIDE AND ACETAMINOPHEN 5; 325 MG/1; MG/1
1 TABLET ORAL PRN
Status: COMPLETED | OUTPATIENT
Start: 2019-10-09 | End: 2019-10-09

## 2019-10-09 RX ORDER — SODIUM CHLORIDE 0.9 % (FLUSH) 0.9 %
10 SYRINGE (ML) INJECTION EVERY 12 HOURS SCHEDULED
Status: DISCONTINUED | OUTPATIENT
Start: 2019-10-09 | End: 2019-10-09 | Stop reason: HOSPADM

## 2019-10-09 RX ORDER — PROPOFOL 10 MG/ML
INJECTION, EMULSION INTRAVENOUS PRN
Status: DISCONTINUED | OUTPATIENT
Start: 2019-10-09 | End: 2019-10-09 | Stop reason: SDUPTHER

## 2019-10-09 RX ORDER — ONDANSETRON 2 MG/ML
4 INJECTION INTRAMUSCULAR; INTRAVENOUS PRN
Status: DISCONTINUED | OUTPATIENT
Start: 2019-10-09 | End: 2019-10-09 | Stop reason: HOSPADM

## 2019-10-09 RX ADMIN — DEXAMETHASONE SODIUM PHOSPHATE 8 MG: 4 INJECTION, SOLUTION INTRAMUSCULAR; INTRAVENOUS at 09:02

## 2019-10-09 RX ADMIN — HEPARIN SODIUM 5000 UNITS: 5000 INJECTION INTRAVENOUS; SUBCUTANEOUS at 07:44

## 2019-10-09 RX ADMIN — OXYCODONE HYDROCHLORIDE AND ACETAMINOPHEN 1 TABLET: 5; 325 TABLET ORAL at 11:00

## 2019-10-09 RX ADMIN — APREPITANT 40 MG: 40 CAPSULE ORAL at 07:49

## 2019-10-09 RX ADMIN — SODIUM CHLORIDE, POTASSIUM CHLORIDE, SODIUM LACTATE AND CALCIUM CHLORIDE: 600; 310; 30; 20 INJECTION, SOLUTION INTRAVENOUS at 07:49

## 2019-10-09 RX ADMIN — SUGAMMADEX 200 MG: 100 INJECTION, SOLUTION INTRAVENOUS at 09:37

## 2019-10-09 RX ADMIN — Medication 2 G: at 08:49

## 2019-10-09 RX ADMIN — SODIUM CHLORIDE, POTASSIUM CHLORIDE, SODIUM LACTATE AND CALCIUM CHLORIDE: 600; 310; 30; 20 INJECTION, SOLUTION INTRAVENOUS at 07:42

## 2019-10-09 RX ADMIN — FENTANYL CITRATE 50 MCG: 50 INJECTION, SOLUTION INTRAMUSCULAR; INTRAVENOUS at 09:10

## 2019-10-09 RX ADMIN — SUCCINYLCHOLINE CHLORIDE 140 MG: 20 INJECTION, SOLUTION INTRAMUSCULAR; INTRAVENOUS at 08:57

## 2019-10-09 RX ADMIN — FENTANYL CITRATE 100 MCG: 50 INJECTION, SOLUTION INTRAMUSCULAR; INTRAVENOUS at 08:56

## 2019-10-09 RX ADMIN — ROCURONIUM BROMIDE 5 MG: 10 INJECTION, SOLUTION INTRAVENOUS at 08:57

## 2019-10-09 RX ADMIN — SUGAMMADEX 200 MG: 100 INJECTION, SOLUTION INTRAVENOUS at 09:33

## 2019-10-09 RX ADMIN — FENTANYL CITRATE 50 MCG: 50 INJECTION, SOLUTION INTRAMUSCULAR; INTRAVENOUS at 09:22

## 2019-10-09 RX ADMIN — SODIUM CHLORIDE, POTASSIUM CHLORIDE, SODIUM LACTATE AND CALCIUM CHLORIDE: 600; 310; 30; 20 INJECTION, SOLUTION INTRAVENOUS at 08:53

## 2019-10-09 RX ADMIN — LIDOCAINE HYDROCHLORIDE 1 ML: 10 INJECTION, SOLUTION EPIDURAL; INFILTRATION; INTRACAUDAL; PERINEURAL at 07:42

## 2019-10-09 RX ADMIN — LIDOCAINE HYDROCHLORIDE 80 MG: 20 INJECTION, SOLUTION INFILTRATION; PERINEURAL at 08:56

## 2019-10-09 RX ADMIN — MIDAZOLAM 2 MG: 1 INJECTION INTRAMUSCULAR; INTRAVENOUS at 08:52

## 2019-10-09 RX ADMIN — PROPOFOL 200 MG: 10 INJECTION, EMULSION INTRAVENOUS at 08:57

## 2019-10-09 RX ADMIN — ROCURONIUM BROMIDE 35 MG: 10 INJECTION, SOLUTION INTRAVENOUS at 09:02

## 2019-10-09 RX ADMIN — ONDANSETRON 4 MG: 2 INJECTION, SOLUTION INTRAMUSCULAR; INTRAVENOUS at 09:02

## 2019-10-09 ASSESSMENT — PULMONARY FUNCTION TESTS
PIF_VALUE: 21
PIF_VALUE: 24
PIF_VALUE: 3
PIF_VALUE: 26
PIF_VALUE: 0
PIF_VALUE: 15
PIF_VALUE: 1
PIF_VALUE: 15
PIF_VALUE: 26
PIF_VALUE: 2
PIF_VALUE: 4
PIF_VALUE: 21
PIF_VALUE: 26
PIF_VALUE: 23
PIF_VALUE: 1
PIF_VALUE: 26
PIF_VALUE: 24
PIF_VALUE: 19
PIF_VALUE: 2
PIF_VALUE: 20
PIF_VALUE: 17
PIF_VALUE: 17
PIF_VALUE: 18
PIF_VALUE: 1
PIF_VALUE: 21
PIF_VALUE: 24
PIF_VALUE: 16
PIF_VALUE: 1
PIF_VALUE: 35
PIF_VALUE: 21
PIF_VALUE: 19
PIF_VALUE: 19
PIF_VALUE: 23
PIF_VALUE: 3
PIF_VALUE: 18
PIF_VALUE: 27
PIF_VALUE: 6
PIF_VALUE: 17
PIF_VALUE: 26
PIF_VALUE: 2
PIF_VALUE: 26
PIF_VALUE: 1
PIF_VALUE: 20
PIF_VALUE: 26
PIF_VALUE: 21
PIF_VALUE: 17
PIF_VALUE: 24
PIF_VALUE: 24
PIF_VALUE: 0
PIF_VALUE: 26
PIF_VALUE: 19
PIF_VALUE: 20
PIF_VALUE: 18
PIF_VALUE: 26

## 2019-10-09 ASSESSMENT — PAIN DESCRIPTION - LOCATION: LOCATION: ABDOMEN

## 2019-10-09 ASSESSMENT — PAIN DESCRIPTION - ORIENTATION: ORIENTATION: UPPER

## 2019-10-09 ASSESSMENT — PAIN DESCRIPTION - PAIN TYPE: TYPE: SURGICAL PAIN

## 2019-10-09 ASSESSMENT — PAIN DESCRIPTION - DESCRIPTORS: DESCRIPTORS: PRESSURE;SORE

## 2019-10-09 ASSESSMENT — PAIN SCALES - GENERAL: PAINLEVEL_OUTOF10: 3

## 2019-10-09 ASSESSMENT — PAIN - FUNCTIONAL ASSESSMENT: PAIN_FUNCTIONAL_ASSESSMENT: 0-10

## 2019-10-10 LAB
EKG ATRIAL RATE: 61 BPM
EKG DIAGNOSIS: NORMAL
EKG P AXIS: 7 DEGREES
EKG P-R INTERVAL: 174 MS
EKG Q-T INTERVAL: 398 MS
EKG QRS DURATION: 80 MS
EKG QTC CALCULATION (BAZETT): 400 MS
EKG R AXIS: 3 DEGREES
EKG T AXIS: 31 DEGREES
EKG VENTRICULAR RATE: 61 BPM

## 2019-10-10 PROCEDURE — 93010 ELECTROCARDIOGRAM REPORT: CPT | Performed by: INTERNAL MEDICINE

## 2019-10-16 ENCOUNTER — TELEPHONE (OUTPATIENT)
Dept: SURGERY | Age: 54
End: 2019-10-16

## 2019-10-21 ENCOUNTER — OFFICE VISIT (OUTPATIENT)
Dept: SURGERY | Age: 54
End: 2019-10-21

## 2019-10-21 VITALS
BODY MASS INDEX: 32.2 KG/M2 | SYSTOLIC BLOOD PRESSURE: 126 MMHG | DIASTOLIC BLOOD PRESSURE: 76 MMHG | HEIGHT: 71 IN | WEIGHT: 230 LBS

## 2019-10-21 DIAGNOSIS — Z09 SURGICAL FOLLOW-UP CARE: Primary | ICD-10-CM

## 2019-10-21 PROCEDURE — 99024 POSTOP FOLLOW-UP VISIT: CPT | Performed by: SURGERY

## 2019-11-04 ENCOUNTER — TELEPHONE (OUTPATIENT)
Dept: FAMILY MEDICINE CLINIC | Age: 54
End: 2019-11-04

## 2019-11-04 DIAGNOSIS — Z12.11 ENCOUNTER FOR SCREENING COLONOSCOPY: Primary | ICD-10-CM

## 2019-11-13 ENCOUNTER — INITIAL CONSULT (OUTPATIENT)
Dept: GASTROENTEROLOGY | Age: 54
End: 2019-11-13
Payer: COMMERCIAL

## 2019-11-13 VITALS
WEIGHT: 230 LBS | SYSTOLIC BLOOD PRESSURE: 122 MMHG | HEIGHT: 71 IN | BODY MASS INDEX: 32.2 KG/M2 | DIASTOLIC BLOOD PRESSURE: 70 MMHG

## 2019-11-13 DIAGNOSIS — R19.5 POSITIVE COLORECTAL CANCER SCREENING USING COLOGUARD TEST: Primary | ICD-10-CM

## 2019-11-13 PROCEDURE — 99203 OFFICE O/P NEW LOW 30 MIN: CPT | Performed by: INTERNAL MEDICINE

## 2019-12-09 ENCOUNTER — HOSPITAL ENCOUNTER (OUTPATIENT)
Age: 54
Setting detail: OUTPATIENT SURGERY
Discharge: HOME | End: 2019-12-09
Attending: INTERNAL MEDICINE | Admitting: INTERNAL MEDICINE
Payer: COMMERCIAL

## 2019-12-09 VITALS
OXYGEN SATURATION: 98 % | SYSTOLIC BLOOD PRESSURE: 118 MMHG | BODY MASS INDEX: 31.5 KG/M2 | DIASTOLIC BLOOD PRESSURE: 77 MMHG | WEIGHT: 225 LBS | RESPIRATION RATE: 16 BRPM | HEIGHT: 71 IN | TEMPERATURE: 98.7 F | HEART RATE: 61 BPM

## 2019-12-09 PROBLEM — K63.5 SIGMOID POLYP: Status: ACTIVE | Noted: 2019-12-09

## 2019-12-09 PROBLEM — K63.5 POLYP OF ASCENDING COLON: Status: ACTIVE | Noted: 2019-12-09

## 2019-12-09 PROCEDURE — 99153 MOD SED SAME PHYS/QHP EA: CPT | Performed by: INTERNAL MEDICINE

## 2019-12-09 PROCEDURE — 99152 MOD SED SAME PHYS/QHP 5/>YRS: CPT | Performed by: INTERNAL MEDICINE

## 2019-12-09 PROCEDURE — 2709999900 HC NON-CHARGEABLE SUPPLY: Performed by: INTERNAL MEDICINE

## 2019-12-09 PROCEDURE — 7100000010 HC PHASE II RECOVERY - FIRST 15 MIN: Performed by: INTERNAL MEDICINE

## 2019-12-09 PROCEDURE — 88305 TISSUE EXAM BY PATHOLOGIST: CPT

## 2019-12-09 PROCEDURE — 7100000011 HC PHASE II RECOVERY - ADDTL 15 MIN: Performed by: INTERNAL MEDICINE

## 2019-12-09 PROCEDURE — 2580000003 HC RX 258: Performed by: INTERNAL MEDICINE

## 2019-12-09 PROCEDURE — 45385 COLONOSCOPY W/LESION REMOVAL: CPT | Performed by: INTERNAL MEDICINE

## 2019-12-09 PROCEDURE — 6360000002 HC RX W HCPCS: Performed by: INTERNAL MEDICINE

## 2019-12-09 PROCEDURE — 3609009900 HC COLONOSCOPY W/CONTROL BLEEDING ANY METHOD: Performed by: INTERNAL MEDICINE

## 2019-12-09 PROCEDURE — 2720000010 HC SURG SUPPLY STERILE: Performed by: INTERNAL MEDICINE

## 2019-12-09 PROCEDURE — 3609010600 HC COLONOSCOPY POLYPECTOMY SNARE/COLD BIOPSY: Performed by: INTERNAL MEDICINE

## 2019-12-09 RX ORDER — SODIUM CHLORIDE, SODIUM LACTATE, POTASSIUM CHLORIDE, CALCIUM CHLORIDE 600; 310; 30; 20 MG/100ML; MG/100ML; MG/100ML; MG/100ML
INJECTION, SOLUTION INTRAVENOUS CONTINUOUS
Status: DISCONTINUED | OUTPATIENT
Start: 2019-12-09 | End: 2019-12-09 | Stop reason: HOSPADM

## 2019-12-09 RX ORDER — FENTANYL CITRATE 50 UG/ML
INJECTION, SOLUTION INTRAMUSCULAR; INTRAVENOUS PRN
Status: DISCONTINUED | OUTPATIENT
Start: 2019-12-09 | End: 2019-12-09 | Stop reason: ALTCHOICE

## 2019-12-09 RX ORDER — SODIUM CHLORIDE, SODIUM LACTATE, POTASSIUM CHLORIDE, CALCIUM CHLORIDE 600; 310; 30; 20 MG/100ML; MG/100ML; MG/100ML; MG/100ML
INJECTION, SOLUTION INTRAVENOUS CONTINUOUS PRN
Status: COMPLETED | OUTPATIENT
Start: 2019-12-09 | End: 2019-12-09

## 2019-12-09 RX ORDER — MIDAZOLAM HYDROCHLORIDE 5 MG/ML
INJECTION INTRAMUSCULAR; INTRAVENOUS PRN
Status: DISCONTINUED | OUTPATIENT
Start: 2019-12-09 | End: 2019-12-09 | Stop reason: ALTCHOICE

## 2019-12-09 RX ADMIN — SODIUM CHLORIDE, POTASSIUM CHLORIDE, SODIUM LACTATE AND CALCIUM CHLORIDE: 600; 310; 30; 20 INJECTION, SOLUTION INTRAVENOUS at 07:02

## 2019-12-09 ASSESSMENT — PAIN SCALES - GENERAL: PAINLEVEL_OUTOF10: 0

## 2019-12-09 ASSESSMENT — PAIN - FUNCTIONAL ASSESSMENT: PAIN_FUNCTIONAL_ASSESSMENT: 0-10

## 2020-01-16 ENCOUNTER — OFFICE VISIT (OUTPATIENT)
Dept: FAMILY MEDICINE CLINIC | Age: 55
End: 2020-01-16
Payer: COMMERCIAL

## 2020-01-16 VITALS
HEIGHT: 71 IN | BODY MASS INDEX: 32.9 KG/M2 | SYSTOLIC BLOOD PRESSURE: 130 MMHG | OXYGEN SATURATION: 98 % | WEIGHT: 235 LBS | HEART RATE: 76 BPM | DIASTOLIC BLOOD PRESSURE: 72 MMHG

## 2020-01-16 PROCEDURE — 99213 OFFICE O/P EST LOW 20 MIN: CPT | Performed by: FAMILY MEDICINE

## 2020-01-16 RX ORDER — AMOXICILLIN 875 MG/1
875 TABLET, COATED ORAL 2 TIMES DAILY
Qty: 20 TABLET | Refills: 0 | Status: SHIPPED | OUTPATIENT
Start: 2020-01-16 | End: 2020-05-20 | Stop reason: SDUPTHER

## 2020-01-16 ASSESSMENT — ENCOUNTER SYMPTOMS
NAUSEA: 0
CHEST TIGHTNESS: 0
DIARRHEA: 0
SHORTNESS OF BREATH: 0
CONSTIPATION: 0
VOMITING: 0
BLOOD IN STOOL: 0

## 2020-01-16 ASSESSMENT — PATIENT HEALTH QUESTIONNAIRE - PHQ9
1. LITTLE INTEREST OR PLEASURE IN DOING THINGS: 0
SUM OF ALL RESPONSES TO PHQ9 QUESTIONS 1 & 2: 0
2. FEELING DOWN, DEPRESSED OR HOPELESS: 0
SUM OF ALL RESPONSES TO PHQ QUESTIONS 1-9: 0
SUM OF ALL RESPONSES TO PHQ QUESTIONS 1-9: 0

## 2020-01-16 NOTE — PROGRESS NOTES
Chief Complaint   Patient presents with    Dizziness       HPI:  Derrell Martinez is a 49 y.o. (: 1965) here today   for   Dizziness   This is a new problem. The current episode started 1 to 4 weeks ago. The problem occurs intermittently. The problem has been gradually worsening. Associated symptoms include fatigue. Pertinent negatives include no chest pain, chills, fever, headaches, nausea or vomiting. The symptoms are aggravated by standing (rolling over, getting up quickly. ). He has tried nothing for the symptoms. The treatment provided no relief.   worse when he lays on his right side or sets up fast. Current sxs began approx 1 mo ago. Has gotten progressively worse. Has not been doing exercise. Patient's medications, allergies, past medical, surgical, social and family histories were reviewed and updated as appropriate. ROS:  Review of Systems   Constitutional: Positive for fatigue. Negative for chills and fever. Respiratory: Negative for chest tightness and shortness of breath. Cardiovascular: Negative for chest pain and palpitations. Gastrointestinal: Negative for blood in stool, constipation, diarrhea, nausea and vomiting. Neurological: Positive for dizziness. Negative for light-headedness and headaches. Prior to Visit Medications    Medication Sig Taking? Authorizing Provider   amoxicillin (AMOXIL) 875 MG tablet Take 1 tablet by mouth 2 times daily for 10 days Yes Edinson Layton MD       No Known Allergies    OBJECTIVE:    /72   Pulse 76   Ht 5' 11\" (1.803 m)   Wt 235 lb (106.6 kg)   SpO2 98%   BMI 32.78 kg/m²     BP Readings from Last 2 Encounters:   20 130/72   19 118/77       Wt Readings from Last 3 Encounters:   20 235 lb (106.6 kg)   19 225 lb (102.1 kg)   19 230 lb (104.3 kg)        Physical Exam  Constitutional:       Appearance: He is well-developed. HENT:      Head: Normocephalic and atraumatic.       Right Ear:

## 2020-01-16 NOTE — PATIENT INSTRUCTIONS
loss of movement in your face, arm, or leg, especially on only one side of your body. ? Sudden vision changes. ? Sudden trouble speaking. ? Sudden confusion or trouble understanding simple statements. ? Sudden problems with walking or balance. ? A sudden, severe headache that is different from past headaches.    Call your doctor now or seek immediate medical care if:    · You have new or worse nausea and vomiting.     · You have new symptoms such as hearing loss or roaring in your ears.    Watch closely for changes in your health, and be sure to contact your doctor if:    · You are not getting better as expected.     · Your vertigo gets worse. Where can you learn more? Go to [email or web address].[email or web address] org and sign in to your [employer] account. Enter  in the [employer] box to learn more about \"Benign Paroxysmal Positional Vertigo (BPPV): Care Instructions. \"     If you do not have an account, please click on the \"Sign Up Now\" link. Current as of: July 28, 2019  Content Version: 12.3  © 0903-2623 [employer]. Care instructions adapted under license by [employer] ([facility]). If you have questions about a medical condition or this instruction, always ask your healthcare professional. [place] [address] any warranty or liability for your use of this information. Patient Education         Vertigo: Head Movements That Help (01:53)  Your health professional recommends that you watch this short online health video. Learn simple head movements to help with vertigo and balance problems. How to watch the video    Scan the QR code   OR Visit the website    https://[email or web address]   Current as of: July 28, 2019  Content Version: 12.3  © 0731-8928 [employer]. Care instructions adapted under license by [employer] ([facility]).  If you have questions about a medical condition or this instruction, always ask your healthcare professional. Deidre Lehman \"Epley Maneuver at Home for Vertigo: Exercises. \"     If you do not have an account, please click on the \"Sign Up Now\" link. Current as of: March 28, 2019  Content Version: 12.3  © 1986-2606 [employer]. Care instructions adapted under license by [facility] ([facility]). If you have questions about a medical condition or this instruction, always ask your healthcare professional. [address] any warranty or liability for your use of this information.

## 2020-03-02 ENCOUNTER — TELEPHONE (OUTPATIENT)
Dept: FAMILY MEDICINE CLINIC | Age: 55
End: 2020-03-02

## 2020-03-02 RX ORDER — OSELTAMIVIR PHOSPHATE 75 MG/1
75 CAPSULE ORAL DAILY
Qty: 10 CAPSULE | Refills: 0 | Status: SHIPPED | OUTPATIENT
Start: 2020-03-02 | End: 2020-03-12

## 2020-05-20 ENCOUNTER — OFFICE VISIT (OUTPATIENT)
Dept: FAMILY MEDICINE CLINIC | Age: 55
End: 2020-05-20

## 2020-05-20 VITALS
OXYGEN SATURATION: 98 % | DIASTOLIC BLOOD PRESSURE: 78 MMHG | TEMPERATURE: 97.2 F | HEIGHT: 71 IN | WEIGHT: 235.2 LBS | SYSTOLIC BLOOD PRESSURE: 118 MMHG | HEART RATE: 62 BPM | BODY MASS INDEX: 32.93 KG/M2

## 2020-05-20 PROCEDURE — 99213 OFFICE O/P EST LOW 20 MIN: CPT | Performed by: FAMILY MEDICINE

## 2020-05-20 RX ORDER — AMOXICILLIN 875 MG/1
875 TABLET, COATED ORAL 2 TIMES DAILY
Qty: 20 TABLET | Refills: 0 | Status: SHIPPED | OUTPATIENT
Start: 2020-05-20 | End: 2020-05-20

## 2020-05-20 RX ORDER — AMOXICILLIN 875 MG/1
875 TABLET, COATED ORAL 2 TIMES DAILY
Qty: 20 TABLET | Refills: 0 | Status: SHIPPED | OUTPATIENT
Start: 2020-05-20 | End: 2020-05-30

## 2020-05-20 ASSESSMENT — ENCOUNTER SYMPTOMS
SINUS PRESSURE: 1
SHORTNESS OF BREATH: 0

## 2020-05-20 NOTE — PROGRESS NOTES
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, type 1       Social History     Socioeconomic History    Marital status:      Spouse name: Not on file    Number of children: Not on file    Years of education: Not on file    Highest education level: Not on file   Occupational History    Not on file   Social Needs    Financial resource strain: Not on file    Food insecurity     Worry: Not on file     Inability: Not on file    Transportation needs     Medical: Not on file     Non-medical: Not on file   Tobacco Use    Smoking status: Never Smoker    Smokeless tobacco: Never Used   Substance and Sexual Activity    Alcohol use: No    Drug use: No    Sexual activity: Not Currently   Lifestyle    Physical activity     Days per week: Not on file     Minutes per session: Not on file    Stress: Not on file   Relationships    Social connections     Talks on phone: Not on file     Gets together: Not on file     Attends Synagogue service: Not on file     Active member of club or organization: Not on file     Attends meetings of clubs or organizations: Not on file     Relationship status: Not on file    Intimate partner violence     Fear of current or ex partner: Not on file     Emotionally abused: Not on file     Physically abused: Not on file     Forced sexual activity: Not on file   Other Topics Concern    Not on file   Social History Narrative    Not on file       Prior to Visit Medications    Medication Sig Taking?  Authorizing Provider   amoxicillin (AMOXIL) 875 MG tablet Take 1 tablet by mouth 2 times daily for 10 days Yes Claudell Pou, MD       No Known Allergies    OBJECTIVE:    /78   Pulse 62   Temp 97.2 °F (36.2 °C) (Tympanic)   Ht 5' 10.5\" (1.791 m)   Wt 235 lb 3.2 oz (106.7 kg)   SpO2 98%   BMI 33.27 kg/m²     BP Readings from Last 2 Encounters:   05/20/20 118/78   01/16/20 130/72       Wt Readings from Last 3 Encounters:   05/20/20 235 lb 3.2 oz (106.7 kg)   01/16/20 235 lb (106.6 kg)   12/09/19 225 lb (102.1 kg) Physical Exam  Constitutional:       Appearance: He is well-developed. HENT:      Head: Normocephalic and atraumatic. Right Ear: Hearing and ear canal normal. A middle ear effusion is present. Tympanic membrane is scarred. Left Ear: Hearing and ear canal normal. A middle ear effusion is present. Mouth/Throat:      Pharynx: Uvula midline. Eyes:      Conjunctiva/sclera: Conjunctivae normal.      Pupils: Pupils are equal, round, and reactive to light. Neck:      Trachea: No tracheal deviation. Cardiovascular:      Rate and Rhythm: Normal rate and regular rhythm. Heart sounds: No murmur. No friction rub. No gallop. Pulmonary:      Effort: Pulmonary effort is normal. No respiratory distress. Breath sounds: Normal breath sounds. Skin:     General: Skin is warm and dry. Findings: No rash. Neurological:      Mental Status: He is alert and oriented to person, place, and time. ASSESSMENT/PLAN:    1. Bilateral serous otitis media, unspecified chronicity  Recurrent symptoms. Antibiotics as below given current symptoms and findings on exam.  See below  - amoxicillin (AMOXIL) 875 MG tablet; Take 1 tablet by mouth 2 times daily for 10 days  Dispense: 20 tablet; Refill: 0    2. Vertigo  Recurrent symptoms from a vertigo standpoint. Symptoms seem to be worse with certain positions and movement. We did discuss ENT referral.  The patient is considering the options. He has tried exercises at home without much relief.   The patient will let us know when he is ready for any ENT referral.

## 2020-09-28 ENCOUNTER — TELEPHONE (OUTPATIENT)
Dept: FAMILY MEDICINE CLINIC | Age: 55
End: 2020-09-28

## 2020-09-28 RX ORDER — FLUCONAZOLE 150 MG/1
150 TABLET ORAL EVERY OTHER DAY
Qty: 3 TABLET | Refills: 0 | Status: SHIPPED | OUTPATIENT
Start: 2020-09-28 | End: 2020-10-03

## 2021-01-28 ENCOUNTER — OFFICE VISIT (OUTPATIENT)
Dept: FAMILY MEDICINE CLINIC | Age: 56
End: 2021-01-28
Payer: COMMERCIAL

## 2021-01-28 VITALS
BODY MASS INDEX: 34.13 KG/M2 | HEART RATE: 62 BPM | SYSTOLIC BLOOD PRESSURE: 122 MMHG | DIASTOLIC BLOOD PRESSURE: 84 MMHG | TEMPERATURE: 97 F | OXYGEN SATURATION: 96 % | HEIGHT: 71 IN | WEIGHT: 243.8 LBS

## 2021-01-28 DIAGNOSIS — N48.1 BALANITIS: Primary | ICD-10-CM

## 2021-01-28 PROCEDURE — 99213 OFFICE O/P EST LOW 20 MIN: CPT | Performed by: FAMILY MEDICINE

## 2021-01-28 RX ORDER — KETOCONAZOLE 20 MG/G
CREAM TOPICAL
Qty: 30 G | Refills: 1 | Status: SHIPPED | OUTPATIENT
Start: 2021-01-28 | End: 2022-06-29

## 2021-01-28 RX ORDER — FLUCONAZOLE 150 MG/1
150 TABLET ORAL
Qty: 5 TABLET | Refills: 0 | Status: SHIPPED | OUTPATIENT
Start: 2021-01-28 | End: 2021-02-12

## 2021-01-28 SDOH — ECONOMIC STABILITY: TRANSPORTATION INSECURITY
IN THE PAST 12 MONTHS, HAS LACK OF TRANSPORTATION KEPT YOU FROM MEETINGS, WORK, OR FROM GETTING THINGS NEEDED FOR DAILY LIVING?: NO

## 2021-01-28 SDOH — ECONOMIC STABILITY: FOOD INSECURITY: WITHIN THE PAST 12 MONTHS, THE FOOD YOU BOUGHT JUST DIDN'T LAST AND YOU DIDN'T HAVE MONEY TO GET MORE.: NEVER TRUE

## 2021-01-28 ASSESSMENT — PATIENT HEALTH QUESTIONNAIRE - PHQ9
2. FEELING DOWN, DEPRESSED OR HOPELESS: 0
1. LITTLE INTEREST OR PLEASURE IN DOING THINGS: 0
SUM OF ALL RESPONSES TO PHQ QUESTIONS 1-9: 0

## 2021-01-28 ASSESSMENT — ENCOUNTER SYMPTOMS: SHORTNESS OF BREATH: 0

## 2021-01-28 NOTE — PROGRESS NOTES
Chief Complaint   Patient presents with    Rash       HPI:  iNka Falcon is a 48 y.o. (: 1965) here today   for rash. Rash w/ burning noted and inflammation. Did improve w/ diflucan, but still w/ residual sxs. Some aggravation seemed to occur w/ underwear w/ color. Now more irritation and dryness, will peel on occas. No sig itch currently. Can depend on the time of day. Burn and itch and inflammation even w/ showering and washing. Some days worse than others. Some urinary urgency noted at times. Volume of urine seems to fluctuate. Has been trying to watch caffeine intake. Has cut down on soda. HPI    Patient's medications, allergies, past medical, surgical, social and family histories were reviewed and updated as appropriate. ROS:  Review of Systems   Constitutional: Negative for fever. Respiratory: Negative for shortness of breath. Genitourinary: Positive for frequency and urgency. Negative for difficulty urinating and dysuria. Prior to Visit Medications    Medication Sig Taking? Authorizing Provider   fluconazole (DIFLUCAN) 150 MG tablet Take 1 tablet by mouth every 3 days for 15 days Yes Letty Regan MD   ketoconazole (NIZORAL) 2 % cream Apply topically daily. Yes Letty Regna MD       No Known Allergies    OBJECTIVE:    /84   Pulse 62   Temp 97 °F (36.1 °C)   Ht 5' 10.5\" (1.791 m)   Wt 243 lb 12.8 oz (110.6 kg)   SpO2 96%   BMI 34.49 kg/m²     BP Readings from Last 2 Encounters:   21 122/84   20 118/78       Wt Readings from Last 3 Encounters:   21 243 lb 12.8 oz (110.6 kg)   20 235 lb 3.2 oz (106.7 kg)   20 235 lb (106.6 kg)       Physical Exam  Constitutional:       Appearance: Normal appearance. Eyes:      Extraocular Movements: Extraocular movements intact. Pulmonary:      Effort: Pulmonary effort is normal.   Genitourinary:     Penis: Circumcised. Erythema present.         Musculoskeletal: Right lower leg: No edema. Left lower leg: No edema. Neurological:      General: No focal deficit present. Mental Status: He is alert and oriented to person, place, and time. Psychiatric:         Mood and Affect: Mood normal.         Behavior: Behavior normal.           ASSESSMENT/PLAN:     1. Balanitis  Most likely etiology. He did get some relief with Diflucan in the past.  We will refill that as below. Given prior relief with Diflucan, trial adding a ketoconazole cream as below. Steroid cream may be an option if fails to improve  - fluconazole (DIFLUCAN) 150 MG tablet; Take 1 tablet by mouth every 3 days for 15 days  Dispense: 5 tablet; Refill: 0  - ketoconazole (NIZORAL) 2 % cream; Apply topically daily. Dispense: 30 g; Refill: 1        This document was prepared by a combination of typing and transcription through a voice recognition software.

## 2021-12-06 ENCOUNTER — OFFICE VISIT (OUTPATIENT)
Dept: FAMILY MEDICINE CLINIC | Age: 56
End: 2021-12-06
Payer: COMMERCIAL

## 2021-12-06 VITALS
WEIGHT: 233 LBS | BODY MASS INDEX: 32.96 KG/M2 | OXYGEN SATURATION: 97 % | DIASTOLIC BLOOD PRESSURE: 80 MMHG | HEART RATE: 68 BPM | TEMPERATURE: 97.4 F | SYSTOLIC BLOOD PRESSURE: 120 MMHG

## 2021-12-06 DIAGNOSIS — R10.13 EPIGASTRIC PAIN: Primary | ICD-10-CM

## 2021-12-06 PROCEDURE — G8427 DOCREV CUR MEDS BY ELIG CLIN: HCPCS

## 2021-12-06 PROCEDURE — G8417 CALC BMI ABV UP PARAM F/U: HCPCS

## 2021-12-06 PROCEDURE — 3017F COLORECTAL CA SCREEN DOC REV: CPT

## 2021-12-06 PROCEDURE — 1036F TOBACCO NON-USER: CPT

## 2021-12-06 PROCEDURE — G8484 FLU IMMUNIZE NO ADMIN: HCPCS

## 2021-12-06 PROCEDURE — 99213 OFFICE O/P EST LOW 20 MIN: CPT

## 2021-12-06 ASSESSMENT — ENCOUNTER SYMPTOMS
SHORTNESS OF BREATH: 0
COLOR CHANGE: 0
VOMITING: 0
EYE DISCHARGE: 0
CHOKING: 0
CONSTIPATION: 0
ANAL BLEEDING: 0
COUGH: 0
ABDOMINAL PAIN: 1
DIARRHEA: 1
CHEST TIGHTNESS: 0
ABDOMINAL DISTENTION: 0
RECTAL PAIN: 0
TROUBLE SWALLOWING: 0
NAUSEA: 0
EYE PAIN: 0
SORE THROAT: 0
RHINORRHEA: 0
WHEEZING: 0
BLOOD IN STOOL: 0

## 2021-12-06 NOTE — PROGRESS NOTES
Chief Complaint   Patient presents with    Abdominal Pain     fatigue and body aches        HPI:  Bishop Gamble is a 55 y.o. (: 1965) here today   for abd pain x2. Started Saturday afternoon. Went to [facility] ER and there was a 2 hour wait so he left and went back to [facility] and was seen in urgent care yesterday for abd pain. The urgent care told him he may just need a probiotic and some gas relief pills. did not draw any blood work or order any imaging. Was sick on Friday with a GI bug and has restlessness, fatigue, loss of appetite. Saturday started with diarrhea that was infrequent. Never had had sharp pain initially. Yesterday felt like he had a pressure in mid abdomen that sometimes will occasionally be in the left upper abdomen that he describes a being punched in the stomach. Had Gull bladder and appendix removed previously. Does not have any sharp right abdominal pain. This is an acute episode and has not had this issue previously. Is not having much discomfort today. Pt felt like his pain was similar to a gull bladder attack. Pt took some gas pills and states they did help his symptoms. States he get a good variety of daily fruits and vegetables. Tries not to consume a high carb diet. Has been taking medication to help with gas relief which has helped his symptoms. Has been increasing water consumption which also has helped the symptoms. Patient's medications, allergies, past medical, surgical, social and family histories were reviewed and updated asappropriate. ROS:  Review of Systems   Constitutional: Negative for activity change, appetite change, chills, fatigue, fever and unexpected weight change. HENT: Negative for rhinorrhea, sore throat and trouble swallowing. Eyes: Negative for pain, discharge and visual disturbance. Respiratory: Negative for cough, choking, chest tightness, shortness of breath and wheezing.     Cardiovascular: Negative for chest pain, palpitations and leg swelling. Gastrointestinal: Positive for abdominal pain and diarrhea. Negative for abdominal distention, anal bleeding, blood in stool, constipation, nausea, rectal pain and vomiting. Endocrine: Negative for cold intolerance and heat intolerance. Genitourinary: Negative for difficulty urinating. Musculoskeletal: Negative for gait problem and neck stiffness. Skin: Negative for color change and rash. Neurological: Negative for dizziness, weakness and headaches. Psychiatric/Behavioral: Negative for dysphoric mood and sleep disturbance. Prior to Visit Medications    Medication Sig Taking? Authorizing Provider   ketoconazole (NIZORAL) 2 % cream Apply topically daily. Rosemarie Etienne MD       No Known Allergies    OBJECTIVE:    /80   Pulse 68   Temp 97.4 °F (36.3 °C)   Wt 233 lb (105.7 kg)   SpO2 97%   BMI 32.96 kg/m²     BP Readings from Last 2 Encounters:   12/06/21 120/80   01/28/21 122/84       Wt Readings from Last 3 Encounters:   12/06/21 233 lb (105.7 kg)   01/28/21 243 lb 12.8 oz (110.6 kg)   05/20/20 235 lb 3.2 oz (106.7 kg)       Physical Exam  Constitutional:       Appearance: Normal appearance. HENT:      Head: Normocephalic and atraumatic. Right Ear: External ear normal.      Left Ear: External ear normal.      Nose: Nose normal. No congestion. Mouth/Throat:      Mouth: Mucous membranes are moist.      Pharynx: Oropharynx is clear. Eyes:      Extraocular Movements: Extraocular movements intact. Pupils: Pupils are equal, round, and reactive to light. Cardiovascular:      Rate and Rhythm: Normal rate and regular rhythm. Pulses: Normal pulses. Heart sounds: Normal heart sounds. No murmur heard. Pulmonary:      Effort: Pulmonary effort is normal. No respiratory distress. Breath sounds: Normal breath sounds. No wheezing. Abdominal:      General: Bowel sounds are normal. There is no distension. Palpations: Abdomen is soft.  There is no mass. Tenderness: There is abdominal tenderness. There is no right CVA tenderness, left CVA tenderness, guarding or rebound. Hernia: No hernia is present. Musculoskeletal:         General: Normal range of motion. Cervical back: Normal range of motion and neck supple. Right lower leg: No edema. Left lower leg: No edema. Skin:     General: Skin is warm and dry. Capillary Refill: Capillary refill takes less than 2 seconds. Findings: No rash. Neurological:      General: No focal deficit present. Mental Status: He is alert and oriented to person, place, and time. Motor: No weakness. Psychiatric:         Mood and Affect: Mood normal.         Behavior: Behavior normal.           ASSESSMENT/PLAN:    1. Epigastric pain  Patient today for acute episode of abdominal pain that started Saturday. Patient states he went to ER at [place] at which time he left because of a 2-hour wait. Then went to [facility] and was seen in the urgent care. Patient states urgent care did not do any blood testing or imaging health we determined that he should take a daily probiotic as well as medication help with gas relief. Upon discussion today the patient was somewhat vague with his abdominal pain description. Patient states he felt initially like even punched in the stomach but the pain is now much improved after taking medication to help relieve gas. Patient did not start taking his probiotic. Patient had a history of appendectomy and cholecystectomy. I discussed the possibility of acid reflux, diverticulosis, pancreatitis, UTI can still be possible sources of abdominal pain related to the descriptive nature and location of his abdominal pain. Patient symptoms could also be related to his acute episode of viral illness that caused some abdominal pain with diarrhea that lasted roughly 24 hours.   Discussed with the patient I would like to do some blood/lab testing to assess for pancreatitis such as amylase/lipase, urinalysis, CBC to assess white blood cell count. Patient declined any blood/lab testing for his acute illness as well as general health maintenance. Patient has not had blood work on record since 2019. Patient stated he will continue to take the gas pills as well as start taking his probiotic and continue to increase water consumption and if his symptoms recur or worsen he will go to the emergency room or call back to the office. This document was prepared by a combination of typing and transcription through a voice recognition software.     Lynne Hamm, SHEILA - CNP

## 2022-06-23 ENCOUNTER — APPOINTMENT (OUTPATIENT)
Dept: GENERAL RADIOLOGY | Age: 57
End: 2022-06-23
Payer: COMMERCIAL

## 2022-06-23 ENCOUNTER — APPOINTMENT (OUTPATIENT)
Dept: CT IMAGING | Age: 57
End: 2022-06-23
Payer: COMMERCIAL

## 2022-06-23 ENCOUNTER — HOSPITAL ENCOUNTER (EMERGENCY)
Age: 57
Discharge: HOME | End: 2022-06-23
Attending: EMERGENCY MEDICINE
Payer: COMMERCIAL

## 2022-06-23 VITALS
RESPIRATION RATE: 18 BRPM | TEMPERATURE: 98.7 F | HEART RATE: 87 BPM | DIASTOLIC BLOOD PRESSURE: 94 MMHG | BODY MASS INDEX: 30.78 KG/M2 | SYSTOLIC BLOOD PRESSURE: 156 MMHG | HEIGHT: 70 IN | OXYGEN SATURATION: 99 % | WEIGHT: 215 LBS

## 2022-06-23 DIAGNOSIS — S32.019A CLOSED FRACTURE OF FIRST LUMBAR VERTEBRA, UNSPECIFIED FRACTURE MORPHOLOGY, INITIAL ENCOUNTER (HCC): ICD-10-CM

## 2022-06-23 DIAGNOSIS — R03.0 ELEVATED BLOOD PRESSURE READING: ICD-10-CM
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
## 2022-06-23 DIAGNOSIS — S93.402A MILD SPRAIN OF LEFT ANKLE, INITIAL ENCOUNTER: ICD-10-CM

## 2022-06-23 PROCEDURE — 72131 CT LUMBAR SPINE W/O DYE: CPT

## 2022-06-23 PROCEDURE — 99284 EMERGENCY DEPT VISIT MOD MDM: CPT

## 2022-06-23 PROCEDURE — 73610 X-RAY EXAM OF ANKLE: CPT

## 2022-06-23 PROCEDURE — 6370000000 HC RX 637 (ALT 250 FOR IP): Performed by: EMERGENCY MEDICINE

## 2022-06-23 PROCEDURE — 72128 CT CHEST SPINE W/O DYE: CPT

## 2022-06-23 RX ORDER — IBUPROFEN 400 MG/1
800 TABLET ORAL ONCE
Status: COMPLETED | OUTPATIENT
Start: 2022-06-23 | End: 2022-06-23

## 2022-06-23 RX ORDER — NAPROXEN 500 MG/1
500 TABLET ORAL 2 TIMES DAILY PRN
Qty: 30 TABLET | Refills: 0 | Status: SHIPPED | OUTPATIENT
Start: 2022-06-23 | End: 2022-09-26

## 2022-06-23 RX ADMIN — IBUPROFEN 800 MG: 400 TABLET, FILM COATED ORAL at 20:10

## 2022-06-23 NOTE — Clinical Note
Lou Box was seen and treated in our emergency department on 6/23/2022. He may return to work on 06/27/2022. If you have any questions or concerns, please don't hesitate to call.       Lenard Bravo, DO

## 2022-06-24 NOTE — ED PROVIDER NOTES
Emergency Department Physician Note     Location: [facility] [address] EMERGENCY DEPARTMENT  6/23/2022    CHIEF COMPLAINT  Fall and Back Pain      HISTORY OF PRESENT ILLNESS  Shemar Calhoun is a 55 y.o. male presents to the ED with fall, thinks he missed a step and fell out of his truck, he is a , injury occurred while working, he did notify his employer, he tried to finish out the day since he had a couple other stops, having some left ankle pain, rolled it, but fell onto his bottom and now having some mid to low back pain, no neck pain, he did fall backwards onto his head but did not hit it hard, states it is not bothering him, no blood thinners or bleeding disorders, no significant head injury or loss of consciousness. No numbness or weakness, has been able to walk since then, but hurts to do certain movements, no chest pain or shortness of breath, no abdominal pain, states the pain in his back radiates around his ribs, but not to his abdomen, no radiation down his legs, no passing out episode/syncope, he has had occasional strains of his back in the past while working, no back surgeries or significant injuries that he is aware of, denies fevers/chills, no IV drug use, no unexplained weight loss, no pain awakening from sleep, no saddle anesthesia, no new numbness or weakness, no direct trauma/injury, no bowel or bladder incontinence, no urinary retention, no history of cancer, no recent steroid use, no immunosuppression, no recent back surgery or injections, not on any anticoagulants, he did contact his employer, they stated they did not need a urine specimen/urine drug test, but no other complaints, modifying factors or associated symptoms. I have reviewed the following from the nursing documentation.     Past Medical History:   Diagnosis Date    Apnea     TOBY on CPAP      Past Surgical History:   Procedure Laterality Date    APPENDECTOMY  2/20/16    CHOLECYSTECTOMY, LAPAROSCOPIC N/A 10/09/2019 WITH CHOLANGIOGRAMS    CHOLECYSTECTOMY, LAPAROSCOPIC N/A 10/9/2019    LAPAROSCOPIC CHOLECYSTECTOMY, WITH CHOLANGIOGRAMS [facility]  [facility]    ascending colon polyp, sigmoid colon polyp    COLONOSCOPY N/A 12/9/2019    COLONOSCOPY POLYPECTOMY SNARE/COLD BIOPSY performed by Maycol Epps MD at [facility] COLONOSCOPY N/A 12/9/2019    COLONOSCOPY CONTROL HEMORRHAGE performed by Maycol Epps MD at [address]      T&A    TYMPANOSTOMY TUBE PLACEMENT      WISDOM TOOTH EXTRACTION       Family History   Problem Relation Age of Onset    Diabetes Mother     Stroke Mother     Diabetes Father     Prostate Cancer Father 72    Coronary Art Dis Father     Diabetes Sister     Diabetes Maternal Grandfather     Diabetes Paternal Grandmother     Prostate Cancer Paternal Grandfather     Diabetes Other         cousin, type 1     Social History     Socioeconomic History    Marital status:      Spouse name: Not on file    Number of children: Not on file    Years of education: Not on file    Highest education level: Not on file   Occupational History    Not on file   Tobacco Use    Smoking status: Never Smoker    Smokeless tobacco: Never Used   Vaping Use    Vaping Use: Never used   Substance and Sexual Activity    Alcohol use: No    Drug use: No    Sexual activity: Not Currently   Other Topics Concern    Not on file   Social History Narrative    Not on file     Social Determinants of Health     Financial Resource Strain:     Difficulty of Paying Living Expenses: Not on file   Food Insecurity:     Worried About [address] in the Last Year: Not on file    [address] in the Last Year: Not on file   Transportation Needs:     Lack of Transportation (Medical): Not on file    Lack of Transportation (Non-Medical):  Not on file   Physical Activity:     Days of Exercise per Week: Not on file    Minutes of Exercise per Session: Not on file   Stress:     Feeling of Stress : Not on file   Social Connections:     Frequency of Communication with Friends and Family: Not on file    Frequency of Social Gatherings with Friends and Family: Not on file    Attends Muslim Services: Not on file    Active Member of Clubs or Organizations: Not on file    Attends Club or Organization Meetings: Not on file    Marital Status: Not on file   Intimate Partner Violence:     Fear of Current or Ex-Partner: Not on file    Emotionally Abused: Not on file    Physically Abused: Not on file    Sexually Abused: Not on file   Housing Stability:     Unable to Pay for Housing in the Last Year: Not on file    Number of [place] in the Last Year: Not on file    Unstable Housing in the Last Year: Not on file     No current facility-administered medications for this encounter. Current Outpatient Medications   Medication Sig Dispense Refill    naproxen (NAPROSYN) 500 MG tablet Take 1 tablet by mouth 2 times daily as needed for Pain With food 30 tablet 0    ketoconazole (NIZORAL) 2 % cream Apply topically daily. 30 g 1     No Known Allergies    REVIEW OF SYSTEMS  10 systems reviewed, pertinent positives per HPI otherwise noted to be negative. PHYSICAL EXAM   BP (!) 156/94   Pulse 87   Temp 98.7 °F (37.1 °C) (Oral)   Resp 18   Ht 5' 10\" (1.778 m)   Wt 215 lb (97.5 kg)   SpO2 99%   BMI 30.85 kg/m²   GENERAL APPEARANCE: Awake and alert. Cooperative. No acute distress  HEAD: Normocephalic. Atraumatic. No vang's sign. no tenderness to posterior scalp, no laceration  EYES: PERRL. EOM's grossly intact. No scleral icterus. No drainage. No periorbital ecchymosis. ENT: Mucous membranes are moist. Airway patent. No stridor. No epistaxis. No otorrhea or rhinorrhea. Long beard  NECK/back: Supple.  No rigidity, he does have lower thoracic/upper lumbar tenderness to palpation of the midline spine, no step-offs, no ecchymosis or visible evidence of trauma, mild pain with straight leg raise bilaterally, mild paraspinal muscle tenderness of lumbar region bilaterally  HEART: RRR. No murmurs  LUNGS: Respirations unlabored, lungs are clear to ausculation bilaterally, no wheezes/crackles/rhonchi   ABDOMEN: Soft. Non-distended. Non-tender. No guarding, no rebound tenderness, no rigidity. Normal bowel sounds. No McBurney's point tenderness, negative Rovsing's sign, negative Brooks's sign  EXTREMITIES: No peripheral edema. Moves all extremities equally. All extremities neurovascularly intact. No obvious deformities. No significant edema/erythema or ecchymosis at the left ankle, 2+ DP and PT pulses bilaterally, distal sensation intact in all digits bilaterally, less than 2-second cap refill in all digits bilaterally, mild pain with inversion/range of motion at the left ankle, but able to perform full dorsiflexion/plantarflexion, slight tenderness to palpation just anterior/inferior to the lateral malleolus, normal digit range of motion  SKIN: Warm and dry. No acute rashes. NEUROLOGICAL: Alert and oriented x4. No gross facial drooping. Strength 5/5, sensation intact. No truncal ataxia. 2+ DTR's present in the Achilles/patellar bilaterally, steady gait, normal speech  PSYCHIATRIC: Normal mood and affect. RADIOLOGY  XR ANKLE LEFT (MIN 3 VIEWS)    Result Date: 6/23/2022  EXAMINATION: THREE XRAY VIEWS OF THE LEFT ANKLE 6/23/2022 8:14 pm COMPARISON: None. HISTORY: ORDERING SYSTEM PROVIDED HISTORY: rolled L ankle, fall TECHNOLOGIST PROVIDED HISTORY: Reason for exam:->rolled L ankle, fall Reason for Exam: rolled L ankle, fall FINDINGS: The ankle mortise is maintained. There is no fracture identified. No acute soft tissue abnormality is appreciated. No significant arthropathic features. No acute osseous abnormality identified.      CT THORACIC SPINE WO CONTRAST    Result Date: 6/23/2022  EXAMINATION: CT OF THE LUMBAR SPINE WITHOUT CONTRAST; CT OF THE THORACIC SPINE WITHOUT CONTRAST  6/23/2022 TECHNIQUE: CT of the lumbar spine was performed without the administration of intravenous contrast. Multiplanar reformatted images are provided for review. Adjustment of mA and/or kV according to patient size was utilized. Automated exposure control, iterative reconstruction, and/or weight based adjustment of the mA/kV was utilized to reduce the radiation dose to as low as reasonably achievable.; CT of the thoracic spine was performed without the administration of intravenous contrast. Multiplanar reformatted images are provided for review. Automated exposure control, iterative reconstruction, and/or weight based adjustment of the mA/kV was utilized to reduce the radiation dose to as low as reasonably achievable. COMPARISON: CT abdomen pelvis 09/28/2019 HISTORY: ORDERING SYSTEM PROVIDED HISTORY: fall, mid/low back pain TECHNOLOGIST PROVIDED HISTORY: Reason for exam:->fall, mid/low back pain Release to patient - Note: Delayed release will only apply to this order. Orders that are changed or resulted outside of the EHR will not respect a delayed release to MyChart. ->Delay Immediate Release by 3 Days Reason for preventing immediate release->Likely risk of substantial harm Decision Support Exception - unselect if not a suspected or confirmed emergency medical condition->Emergency Medical Condition (MA) Reason for Exam: fall, mid/low back pain; ORDERING SYSTEM PROVIDED HISTORY: fall, mid/low back pain TECHNOLOGIST PROVIDED HISTORY: Reason for exam:->fall, mid/low back pain Release to patient - Note: Delayed release will only apply to this order. Orders that are changed or resulted outside of the EHR will not respect a delayed release to MyChart. ->Delay Immediate Release by 3 Days Reason for preventing immediate release->Likely risk of substantial harm Reason for Exam: fall, mid/low back pain FINDINGS: THORACIC: BONES/ALIGNMENT: Chronic fall, mid/low back pain TECHNOLOGIST PROVIDED HISTORY: Reason for exam:->fall, mid/low back pain Release to patient - Note: Delayed release will only apply to this order. Orders that are changed or resulted outside of the EHR will not respect a delayed release to MyChart. ->Delay Immediate Release by 3 Days Reason for preventing immediate release->Likely risk of substantial harm Decision Support Exception - unselect if not a suspected or confirmed emergency medical condition->Emergency Medical Condition (MA) Reason for Exam: fall, mid/low back pain; ORDERING SYSTEM PROVIDED HISTORY: fall, mid/low back pain TECHNOLOGIST PROVIDED HISTORY: Reason for exam:->fall, mid/low back pain Release to patient - Note: Delayed release will only apply to this order. Orders that are changed or resulted outside of the EHR will not respect a delayed release to MyChart. ->Delay Immediate Release by 3 Days Reason for preventing immediate release->Likely risk of substantial harm Reason for Exam: fall, mid/low back pain FINDINGS: THORACIC: BONES/ALIGNMENT: Chronic appearing anterior vertebral body height loss of T7 and T8. in her thoracic vertebral body heights are maintained. No traumatic malalignment. DEGENERATIVE CHANGES: No gross spinal canal stenosis or bony neural foraminal narrowing of the thoracic spine. SOFT TISSUES: No paraspinal hematoma. No acute findings identified in the visualized lung fields. LUMBAR: BONES/ALIGNMENT: Nondisplaced right anterior superior corner fracture of L1. Remainder of lumbar vertebral body heights are maintained. No traumatic malalignment. Degenerative endplate changes at J0-0. DEGENERATIVE CHANGES: Multilevel degenerative disc disease most notable at L3-4 and L5-S1. Lower lumbar facet arthropathy. SOFT TISSUES: No paraspinal hematoma. 1.  Nondisplaced right anterior superior corner fracture of L1. 2.  No acute osseous abnormality identified in the thoracic spine.   Chronic appearing mild anterior vertebral body height loss at T7 and T8 with associated degenerative change. ED COURSE/MDM  Patient seen and evaluated. Old records reviewed. Labs and imaging reviewed and results discussed with patient. 55 y.o. male with back pain, ankle pain, he was able to ambulate on the ankle, only mild sprain, not unstable, and no acute process on x-ray, he did have a nondisplaced right anterior superior corner fracture of L1, which is where he was tender to palpation, explained this was not typically an unstable fracture requiring surgery, however explained that he needs to follow-up with occupational therapy, given the state number for this, but explained he would need to contact his employer to follow-up about Worker's Comp., I did fill out the initial form, he was given the printout of his imaging reports to take with him as well, explained he may need to see orthospine specialist as well, there were chronic appearing changes at T7-T8 but this is not the area of his tenderness to palpation, he felt a little better after the ibuprofen, he stated he usually gets improvement with pain with Advil/Aleve at home, he did accept the prescription for Naprosyn, sent to his pharmacy, encouraged RICE with the ankle, and he may benefit more from a heating pad, muscle rubs for his back, but he was neurovascularly intact in his lower extremities, no current concern for cauda equina/epidural abscess/epidural hematoma at this time, denies any significant neck/head injury, blood pressure was initially elevated, encouraged primary care follow-up, strict return precautions given, all questions answered, will return if any worsening symptoms or new concerns, see AVS for further discharge information, patient verbalized understanding of plan, felt comfortable going home.      Orders Placed This Encounter   Procedures    CT THORACIC SPINE WO CONTRAST    CT Lumbar Spine WO Contrast    XR ANKLE LEFT (MIN 3 VIEWS) Ramirez Urban, DO  06/23/22 [id number]

## 2022-06-29 ENCOUNTER — TELEPHONE (OUTPATIENT)
Dept: FAMILY MEDICINE CLINIC | Age: 57
End: 2022-06-29

## 2022-06-29 ENCOUNTER — OFFICE VISIT (OUTPATIENT)
Dept: FAMILY MEDICINE CLINIC | Age: 57
End: 2022-06-29
Payer: COMMERCIAL

## 2022-06-29 VITALS
HEART RATE: 72 BPM | BODY MASS INDEX: 31.61 KG/M2 | WEIGHT: 220.8 LBS | SYSTOLIC BLOOD PRESSURE: 130 MMHG | OXYGEN SATURATION: 97 % | HEIGHT: 70 IN | DIASTOLIC BLOOD PRESSURE: 80 MMHG

## 2022-06-29 DIAGNOSIS — S32.019D CLOSED FRACTURE OF FIRST LUMBAR VERTEBRA WITH ROUTINE HEALING, UNSPECIFIED FRACTURE MORPHOLOGY, SUBSEQUENT ENCOUNTER: Primary | ICD-10-CM
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
## 2022-06-29 DIAGNOSIS — S93.402S SPRAIN OF LEFT ANKLE, UNSPECIFIED LIGAMENT, SEQUELA: ICD-10-CM

## 2022-06-29 PROCEDURE — 1036F TOBACCO NON-USER: CPT | Performed by: FAMILY MEDICINE

## 2022-06-29 PROCEDURE — G8427 DOCREV CUR MEDS BY ELIG CLIN: HCPCS | Performed by: FAMILY MEDICINE

## 2022-06-29 PROCEDURE — 3017F COLORECTAL CA SCREEN DOC REV: CPT | Performed by: FAMILY MEDICINE

## 2022-06-29 PROCEDURE — 99213 OFFICE O/P EST LOW 20 MIN: CPT | Performed by: FAMILY MEDICINE

## 2022-06-29 PROCEDURE — G8417 CALC BMI ABV UP PARAM F/U: HCPCS | Performed by: FAMILY MEDICINE

## 2022-06-29 ASSESSMENT — PATIENT HEALTH QUESTIONNAIRE - PHQ9
2. FEELING DOWN, DEPRESSED OR HOPELESS: 0
SUM OF ALL RESPONSES TO PHQ QUESTIONS 1-9: 0
SUM OF ALL RESPONSES TO PHQ QUESTIONS 1-9: 0
8. MOVING OR SPEAKING SO SLOWLY THAT OTHER PEOPLE COULD HAVE NOTICED. OR THE OPPOSITE, BEING SO FIGETY OR RESTLESS THAT YOU HAVE BEEN MOVING AROUND A LOT MORE THAN USUAL: 0
SUM OF ALL RESPONSES TO PHQ9 QUESTIONS 1 & 2: 0
SUM OF ALL RESPONSES TO PHQ QUESTIONS 1-9: 0
9. THOUGHTS THAT YOU WOULD BE BETTER OFF DEAD, OR OF HURTING YOURSELF: 0
5. POOR APPETITE OR OVEREATING: 0
4. FEELING TIRED OR HAVING LITTLE ENERGY: 0
7. TROUBLE CONCENTRATING ON THINGS, SUCH AS READING THE NEWSPAPER OR WATCHING TELEVISION: 0
6. FEELING BAD ABOUT YOURSELF - OR THAT YOU ARE A FAILURE OR HAVE LET YOURSELF OR YOUR FAMILY DOWN: 0
SUM OF ALL RESPONSES TO PHQ QUESTIONS 1-9: 0
10. IF YOU CHECKED OFF ANY PROBLEMS, HOW DIFFICULT HAVE THESE PROBLEMS MADE IT FOR YOU TO DO YOUR WORK, TAKE CARE OF THINGS AT HOME, OR GET ALONG WITH OTHER PEOPLE: 0
3. TROUBLE FALLING OR STAYING ASLEEP: 0
1. LITTLE INTEREST OR PLEASURE IN DOING THINGS: 0

## 2022-06-29 ASSESSMENT — ENCOUNTER SYMPTOMS: BACK PAIN: 1

## 2022-06-29 NOTE — PROGRESS NOTES
Chief Complaint   Patient presents with    Back Pain       HPI:  Silvina Jacobsen is a 55 y.o. (: 1965) here today   for back pain after being injured at work. 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 Had left ankle sprain/twist when fell. Was coming out of the truck. ? Slipped or missed step. Twisted ankle, then fell backwards. Landed on ground. Occurred around 330pm.  To ER later that pm.  Found to have compression fracture. Nondisplaced. Plans to see ortho as well. Pain overall improving. Has been using naproxen for pain. Seen by [facility] yesterday. Still some issues w/ discomfort w/ trying to sleep. No issues w/ bowels or bladder. No weakness, numbness or tingling  No sig pain to left ankle. Had been seen in ER and [facility]. Paperwork submitted to   [facility]    Patient's medications, allergies, past medical, surgical, social and family histories were reviewed and updated as appropriate. ROS:  Review of Systems   Constitutional: Negative for fever. Musculoskeletal: Positive for arthralgias and back pain. Neurological: Negative for weakness and numbness. Prior to Visit Medications    Medication Sig Taking? Authorizing Provider   naproxen (NAPROSYN) 500 MG tablet Take 1 tablet by mouth 2 times daily as needed for Pain With food Yes Vito Haro, DO       No Known Allergies    OBJECTIVE:    /80   Pulse 72   Ht 5' 10\" (1.778 m)   Wt 220 lb 12.8 oz (100.2 kg)   SpO2 97%   BMI 31.68 kg/m²     BP Readings from Last 2 Encounters:   22 130/80   22 (!) 156/94       Wt Readings from Last 3 Encounters:   22 220 lb 12.8 oz (100.2 kg)   22 215 lb (97.5 kg)   21 233 lb (105.7 kg)       Physical Exam  Constitutional:       Appearance: Normal appearance. HENT:      Head: Normocephalic and atraumatic. Eyes:      Extraocular Movements: Extraocular movements intact.    Cardiovascular:      Rate and Rhythm: Normal rate and regular rhythm. Pulmonary:      Effort: Pulmonary effort is normal.      Breath sounds: Normal breath sounds. Musculoskeletal:      Lumbar back: Tenderness present. Left ankle: No tenderness. Normal range of motion. Comments: Mild tenderness to region of L1, mild paraspinous tenderness. Skin:     General: Skin is warm and dry. Neurological:      General: No focal deficit present. Mental Status: He is alert and oriented to person, place, and time. Psychiatric:         Mood and Affect: Mood normal.         Behavior: Behavior normal.           ASSESSMENT/PLAN:     1. Closed fracture of first lumbar vertebra with routine healing, unspecified fracture morphology, subsequent encounter  Pain improved. Will need ortho referral per [facility]. F/u w/ [facility] as sched. Reviewed ER note and prior imaging. 2. Fall, sequela  See above. 3. Work related injury  See above. 4. Sprain of left ankle, unspecified ligament, sequela  No sig pain today. Address back as above. Monitor sxs.

## 2022-06-29 NOTE — TELEPHONE ENCOUNTER
Left message on vm for Sonali with Dr Belinda Oconnor, [facility] dept will all of the patient's information. Trying to get him set up to see Belinda Oconnor or an associate for his new WC injury. Referral placed.

## 2022-07-06 ENCOUNTER — TELEPHONE (OUTPATIENT)
Dept: ORTHOPEDIC SURGERY | Age: 57
End: 2022-07-06

## 2022-07-06 ENCOUNTER — OFFICE VISIT (OUTPATIENT)
Dept: ORTHOPEDIC SURGERY | Age: 57
End: 2022-07-06
Payer: COMMERCIAL

## 2022-07-06 VITALS — WEIGHT: 220 LBS | BODY MASS INDEX: 31.5 KG/M2 | HEIGHT: 70 IN

## 2022-07-06 DIAGNOSIS — S39.012A STRAIN OF LUMBAR REGION, INITIAL ENCOUNTER: ICD-10-CM

## 2022-07-06 DIAGNOSIS — S32.010A CLOSED COMPRESSION FRACTURE OF BODY OF L1 VERTEBRA (HCC): Primary | ICD-10-CM

## 2022-07-06 PROCEDURE — L0627 LO SAG RI AN/POS PNL PRE CST: HCPCS | Performed by: PHYSICIAN ASSISTANT

## 2022-07-06 PROCEDURE — 1036F TOBACCO NON-USER: CPT | Performed by: PHYSICIAN ASSISTANT

## 2022-07-06 PROCEDURE — 3017F COLORECTAL CA SCREEN DOC REV: CPT | Performed by: PHYSICIAN ASSISTANT

## 2022-07-06 PROCEDURE — 99204 OFFICE O/P NEW MOD 45 MIN: CPT | Performed by: PHYSICIAN ASSISTANT

## 2022-07-06 PROCEDURE — G8417 CALC BMI ABV UP PARAM F/U: HCPCS | Performed by: PHYSICIAN ASSISTANT

## 2022-07-06 PROCEDURE — G8427 DOCREV CUR MEDS BY ELIG CLIN: HCPCS | Performed by: PHYSICIAN ASSISTANT

## 2022-07-06 NOTE — LETTER
[address] [facility] [address]  Phone: [phone]  Fax: [phone]    Cora Martinez        July 6, 2022     Patient: Nette Abrams   YOB: 1965   Date of Visit: 7/6/2022       To Whom It May Concern: It is my medical opinion that Darin Dunbar should remain out of work until 8/6/2022. If you have any questions or concerns, please don't hesitate to call.     Sincerely,        Asuncion Thompson PA-C

## 2022-07-06 NOTE — PROGRESS NOTES
New Patient: Harpreet [place]    7/6/2022     CHIEF COMPLAINT:    Chief Complaint   Patient presents with    New Patient     NP/LBP/BWC       HISTORY OF PRESENT ILLNESS:              The patient is a 55 y.o. male history of sleep apnea referred by Dr. Naida Newby MD for back pain and work injury 6/23/2022. He works as a  and on this date missed a step and landed on his buttocks/back. He developed immediate aching and stabbing midline low back pain. He was seen and evaluated at the ED and diagnosed with a compression fracture. His pain initially was fairly constant and severe. Currently symptoms are increased with any prolonged activity or prolonged inactivity. He reports relief with heat and naproxen. He does report improvement at this time overall. He currently denies any lower extremity radiating pain. He denies any numbness tingling or extremity weakness. Denies any recent bowel or bladder dysfunction or saddle anesthesia. Denies any back issues prior to this work injury. Past Medical History:   Diagnosis Date    Apnea     TOBY on CPAP       The pain assessment was noted & reviewed in the medical record today.      Current/Past Treatment:   · Physical Therapy:   · Chiropractic:     · Injection:     Medications:            NSAIDS: Naproxen with benefit            Muscle relaxer:              Steriods:              Neuropathic medications:              Opioids:            Other:   · Surgery/Consult:    Work Status/Functionality:  has been off, light duty unavailable    Past Medical History: Medical history form was reviewed today & scanned into the media tab  Past Medical History:   Diagnosis Date    Apnea     TOBY on CPAP       Past Surgical History:     Past Surgical History:   Procedure Laterality Date    APPENDECTOMY  2/20/16    CHOLECYSTECTOMY, LAPAROSCOPIC N/A 10/09/2019    WITH CHOLANGIOGRAMS    CHOLECYSTECTOMY, LAPAROSCOPIC N/A 10/9/2019    LAPAROSCOPIC CHOLECYSTECTOMY, WITH CHOLANGIOGRAMS performed by Nick Rogers MD at [facility]  12/09/2019    ascending colon polyp, sigmoid colon polyp    COLONOSCOPY N/A 12/9/2019    COLONOSCOPY POLYPECTOMY SNARE/COLD BIOPSY performed by Carolyn Woodall MD at [facility] COLONOSCOPY N/A 12/9/2019    COLONOSCOPY CONTROL HEMORRHAGE performed by Carolyn Woodall MD at [address]      T&A    TYMPANOSTOMY TUBE PLACEMENT      WISDOM TOOTH EXTRACTION       Current Medications:     Current Outpatient Medications:     naproxen (NAPROSYN) 500 MG tablet, Take 1 tablet by mouth 2 times daily as needed for Pain With food, Disp: 30 tablet, Rfl: 0  Allergies:  Patient has no known allergies. Social History:    reports that he has never smoked. He has never used smokeless tobacco. He reports that he does not drink alcohol and does not use drugs. Family History:   Family History   Problem Relation Age of Onset    Diabetes Mother     Stroke Mother     Diabetes Father     Prostate Cancer Father 72    Coronary Art Dis Father     Diabetes Sister     Diabetes Maternal Grandfather     Diabetes Paternal Grandmother     Prostate Cancer Paternal Grandfather     Diabetes Other         cousin, type 1       REVIEW OF SYSTEMS: Full ROS reviewed & scanned into chart  CONSTITUTIONAL: Denies unexplained weight loss, fevers   SKIN: Denies active skin conditions   ENT: Denies dizziness, nosebleeds  RESPIRATORY: Denies current dyspnea, difficulty breathing  CARDIOVASCULAR: Denies chest pain   NEUROLOGICAL: Denies stroke, unsteady gait or progressive weakness  PSYCHOLOGICAL: Denies anxiety, depression   HEMATOLOGIC: Denies blood disorders, cancer  ENDOCRINE: Denies excessive thirst, urination, heat/cold  GI: Denies ulcer, nausea, vomiting, diarrhea   : Denies bowel or bladder incontinence       PHYSICAL EXAM:    Vitals: Height 5' 10\" (1.778 m), weight 220 lb (99.8 kg).   Pain score 3/10    GENERAL EXAM:  · General Apparence: Patient is adequately groomed with no evidence of malnutrition. · Orientation: The patient is oriented to time, place and person. · Mood & Affect:The patient's mood and affect are appropriate   · Vascular: Examination reveals no swelling tenderness in upper or lower extremities. Good capillary refill  · Lymphatic: The lymphatic examination bilaterally reveals all areas to be without enlargement or induration  · Sensation: Sensation is intact without deficit  · Coordination/Balance: Good coordination   LUMBAR/SACRAL EXAMINATION:  · Inspection: Local inspection shows no step-off or bruising. · Palpation:   No evidence of tenderness at the midline. Nontender to palpation or percussion. · Range of Motion: Able to sit forward flex extension not assessed due to fracture  · Strength:   Strength testing is 5/5 in all muscle groups tested. · Special Tests:   Straight leg raise and crossed SLR negative. Leg length and pelvis level.  0 out of 5 Graciela's signs. · Skin: There are no rashes, ulcerations or lesions. · Reflexes: Reflexes are symmetrically 2+ at the patellar and ankle tendons. Clonus absent bilaterally at the feet. · Gait & station: Normal unassisted  · Additional Examinations:   · RIGHT LOWER EXTREMITY: Inspection/examination of the right lower extremity does not show any tenderness, deformity or injury. Range of motion is full. There is no gross instability. There are no rashes, ulcerations or lesions. Strength and tone are normal.  ·   · LEFT LOWER EXTREMITY:  Inspection/examination of the left lower extremity does not show any tenderness, deformity or injury. Range of motion is full. There is no gross instability. There are no rashes, ulcerations or lesions.   Strength and tone are normal.    Diagnostic Testing:    Lumbar CT scan and report independently reviewed from 6/23/2022 showing L1 nondisplaced superior endplate fracture, severe L5-S1 DDD    Thoracic CT scan report dependently reviewed 6/23/2022 chronic wedging T7-T8 with DDD    ED notes reviewed        Impression:  1) Low back pain, L1 endplate fracture  2) Lumbar strain  3) Work injury 6-23-22  4) L5-S1 DDD, wedging T7-8  5) TOBY      Plan:   1) We had a long discussion. We reviewed his thoracic and lumbar CT scans today in detail. He does appear symptomatic to L1 endplate fracture    2) Lumbar MRI WO--assess L1 level    3) Exos form brace     4) Cont Naproxen 500mg BID PRN--may call for refill if needed    5) He does not have light duty available unfortunately discussed continuing off work x1 month    6) F/u 1mo for re-evaluation            Procedures    [place] Contour LSO     Patient was prescribed an [employer]. This orthosis is required for the following reasons:    Reduce pain by restricting mobility of the trunk  Facilitate healing following an injury to the spine or related soft tissues    The prefabricated orthosis was modified in the following manner to provide a customizable fit for the patient at the time of delivery. Identification of appropriate positioning and alignment of anatomical landmarks  Manual adjustment to bend, mold, shape, and/or contour components and reassemble orthosis  Trimming of straps, guides, panels, inserts, and/or other materials and reassemble orthosis  Polycentric hinge adjustments of flexion/extension components or other settings including air chambers with use of setting tool  Item was customized to fit patient by an individual with expertise and specialized training    The patient was educated and fit by a healthcare professional with expert knowledge and specialization in brace application while under the direct supervision of the treating physician. Verbal and written instructions for the use of and application of this item were provided.    They were instructed to contact the office immediately should the brace result in increased pain, decreased sensation, increased swelling or worsening of the condition.            Yesika Diaz PA-C, MPAS  Board Certified by the [facility] [address]

## 2022-07-12 ENCOUNTER — TELEPHONE (OUTPATIENT)
Dept: ORTHOPEDIC SURGERY | Age: 57
End: 2022-07-12

## 2022-07-12 NOTE — TELEPHONE ENCOUNTER
Called & spoke with the patient informing him I was calling in regards to his MRI. Patient was informed his MRI is approved through [facility] and he may go ahead and get it schedule at [facility].     Patient voiced understanding he was informed that once the MRI is scheduled he may move up his f/u appointment to an earlier time as well. Patient voiced understanding.

## 2022-07-19 ENCOUNTER — HOSPITAL ENCOUNTER (OUTPATIENT)
Dept: MRI IMAGING | Age: 57
Discharge: HOME | End: 2022-07-19
Payer: COMMERCIAL

## 2022-07-19 DIAGNOSIS — S32.010A CLOSED COMPRESSION FRACTURE OF BODY OF L1 VERTEBRA (HCC): ICD-10-CM

## 2022-07-19 PROCEDURE — 72148 MRI LUMBAR SPINE W/O DYE: CPT

## 2022-07-20 ENCOUNTER — TELEPHONE (OUTPATIENT)
Dept: ORTHOPEDIC SURGERY | Age: 57
End: 2022-07-20

## 2022-07-26 ENCOUNTER — OFFICE VISIT (OUTPATIENT)
Dept: ORTHOPEDIC SURGERY | Age: 57
End: 2022-07-26
Payer: COMMERCIAL

## 2022-07-26 ENCOUNTER — TELEPHONE (OUTPATIENT)
Dept: ORTHOPEDIC SURGERY | Age: 57
End: 2022-07-26

## 2022-07-26 VITALS — WEIGHT: 220 LBS | BODY MASS INDEX: 31.5 KG/M2 | HEIGHT: 70 IN

## 2022-07-26 DIAGNOSIS — S39.012D LUMBAR STRAIN, SUBSEQUENT ENCOUNTER: ICD-10-CM

## 2022-07-26 DIAGNOSIS — S32.010A CLOSED COMPRESSION FRACTURE OF BODY OF L1 VERTEBRA (HCC): Primary | ICD-10-CM

## 2022-07-26 PROCEDURE — 1036F TOBACCO NON-USER: CPT | Performed by: PHYSICIAN ASSISTANT

## 2022-07-26 PROCEDURE — 3017F COLORECTAL CA SCREEN DOC REV: CPT | Performed by: PHYSICIAN ASSISTANT

## 2022-07-26 PROCEDURE — G8427 DOCREV CUR MEDS BY ELIG CLIN: HCPCS | Performed by: PHYSICIAN ASSISTANT

## 2022-07-26 PROCEDURE — 99214 OFFICE O/P EST MOD 30 MIN: CPT | Performed by: PHYSICIAN ASSISTANT

## 2022-07-26 PROCEDURE — G8417 CALC BMI ABV UP PARAM F/U: HCPCS | Performed by: PHYSICIAN ASSISTANT

## 2022-07-26 RX ORDER — MELOXICAM 15 MG/1
15 TABLET ORAL DAILY
Qty: 30 TABLET | Refills: 0 | Status: SHIPPED | OUTPATIENT
Start: 2022-07-26 | End: 2022-09-26

## 2022-07-26 NOTE — LETTER
[facility] [address]  [facility] [address]  Phone: [phone]  Fax: [phone]    Mecca Phan        July 26, 2022     Patient: Davion Jc   YOB: 1965   Date of Visit: 7/26/2022       To Whom It May Concern: It is my medical opinion that Ormahi Lipsergei should remain out of work until 8/26/2022. If you have any questions or concerns, please don't hesitate to call.     Sincerely,        Paulina Singer PA-C

## 2022-07-26 NOTE — PROGRESS NOTES
CHOLANGIOGRAMS    CHOLECYSTECTOMY, LAPAROSCOPIC N/A 10/9/2019    LAPAROSCOPIC CHOLECYSTECTOMY, WITH CHOLANGIOGRAMS performed by Ale Bradford MD at [facility]  12/09/2019    ascending colon polyp, sigmoid colon polyp    COLONOSCOPY N/A 12/9/2019    COLONOSCOPY POLYPECTOMY SNARE/COLD BIOPSY performed by Cesilia Fiore MD at [place] [address] N/A 12/9/2019    COLONOSCOPY CONTROL HEMORRHAGE performed by Cesilia Fiore MD at [facility]      T&A    TYMPANOSTOMY TUBE PLACEMENT      WISDOM TOOTH EXTRACTION       Current Medications:     Current Outpatient Medications:     naproxen (NAPROSYN) 500 MG tablet, Take 1 tablet by mouth 2 times daily as needed for Pain With food, Disp: 30 tablet, Rfl: 0  Allergies:  Patient has no known allergies. Social History:    reports that he has never smoked. He has never used smokeless tobacco. He reports that he does not drink alcohol and does not use drugs. Family History:   Family History   Problem Relation Age of Onset    Diabetes Mother     Stroke Mother     Diabetes Father     Prostate Cancer Father 72    Coronary Art Dis Father     Diabetes Sister     Diabetes Maternal Grandfather     Diabetes Paternal Grandmother     Prostate Cancer Paternal Grandfather     Diabetes Other         cousin, type 1       REVIEW OF SYSTEMS: Full ROS reviewed & scanned into chart  CONSTITUTIONAL: Denies unexplained weight loss, fevers   SKIN: Denies active skin conditions     PHYSICAL EXAM:    Vitals:    7/26/2022 10:11 AM   Weight 220 lb (99.8 kg)   Height 5' 10\" (1.778 m)   Pain Score 4   Pain Loc Back        GENERAL EXAM:  General Apparence: Patient is adequately groomed with no evidence of malnutrition. Orientation: The patient is oriented to time, place and person.    Mood & Affect:The patient's mood and affect are appropriate   Lymphatic: The lymphatic examination bilaterally reveals all areas to be without enlargement or induration  Sensation: Sensation is intact without deficit  Coordination/Balance: Good coordination   LUMBAR/SACRAL EXAMINATION:  Inspection: Local inspection shows no step-off or bruising. LSO  Palpation:   No evidence of tenderness at the midline. Nontender to palpation or percussion. Range of Motion: Able to sit forward flex extension not assessed due to fracture  Strength:   Strength testing is 5/5 in all muscle groups tested. Special Tests:   Straight leg raise and crossed SLR negative. Leg length and pelvis level.  0 out of 5 Graciela's signs. Skin: There are no rashes, ulcerations or lesions. Reflexes: Reflexes are symmetrically 2+ at the patellar and ankle tendons. Clonus absent bilaterally at the feet. Gait & station: Normal unassisted  Additional Examinations:   RIGHT LOWER EXTREMITY: Inspection/examination of the right lower extremity does not show any tenderness, deformity or injury. Range of motion is full. There is no gross instability. There are no rashes, ulcerations or lesions. Strength and tone are normal.  LEFT LOWER EXTREMITY:  Inspection/examination of the left lower extremity does not show any tenderness, deformity or injury. Range of motion is full. There is no gross instability. There are no rashes, ulcerations or lesions. Strength and tone are normal.    Diagnostic Testing:    Lumbar MRI scan report independently reviewed July 2022 showing a subacute L1 compression fracture with 30% loss of height. There is no evidence of retropulsion. Multilevel DDD/spondylosis with Modic changes L3-4 and L5-S1.  mild central stenosis L4-5.     Lumbar CT scan and report independently reviewed from 6/23/2022 showing L1 nondisplaced superior endplate fracture, severe L5-S1 DDD    Thoracic CT scan report dependently reviewed 6/23/2022 chronic wedging T7-T8 with DDD    ED notes reviewed        Impression:  1) Low back pain, L1 endplate fracture--now subacute  2) Lumbar strain  3) Work injury 6-23-22  4) L5-S1 DDD, wedging T7-8  5) TOBY      Plan:   1) We reviewed his lumbar MRI scan today in detail. MRI indicates a subacute L1 compression fracture with 30% loss of height. I recommend continue bracing and activity modification.   2) Mobic 15mg I po qd PRN  3) Off work 1mo--no real light duty is available  4) F/u 1mo for re-evaluation         Cass Stringer PA-C, MPAS  Board Certified by the [address]

## 2022-07-27 ENCOUNTER — TELEPHONE (OUTPATIENT)
Dept: ORTHOPEDIC SURGERY | Age: 57
End: 2022-07-27

## 2022-08-24 ENCOUNTER — OFFICE VISIT (OUTPATIENT)
Dept: ORTHOPEDIC SURGERY | Age: 57
End: 2022-08-24
Payer: COMMERCIAL

## 2022-08-24 VITALS — HEIGHT: 70 IN | BODY MASS INDEX: 31.5 KG/M2 | WEIGHT: 220 LBS

## 2022-08-24 DIAGNOSIS — S39.012D LUMBAR STRAIN, SUBSEQUENT ENCOUNTER: Primary | ICD-10-CM

## 2022-08-24 DIAGNOSIS — S32.010A CLOSED COMPRESSION FRACTURE OF BODY OF L1 VERTEBRA (HCC): ICD-10-CM

## 2022-08-24 PROCEDURE — 1036F TOBACCO NON-USER: CPT | Performed by: PHYSICIAN ASSISTANT

## 2022-08-24 PROCEDURE — 99214 OFFICE O/P EST MOD 30 MIN: CPT | Performed by: PHYSICIAN ASSISTANT

## 2022-08-24 PROCEDURE — G8427 DOCREV CUR MEDS BY ELIG CLIN: HCPCS | Performed by: PHYSICIAN ASSISTANT

## 2022-08-24 PROCEDURE — G8417 CALC BMI ABV UP PARAM F/U: HCPCS | Performed by: PHYSICIAN ASSISTANT

## 2022-08-24 PROCEDURE — 3017F COLORECTAL CA SCREEN DOC REV: CPT | Performed by: PHYSICIAN ASSISTANT

## 2022-08-24 NOTE — PROGRESS NOTES
FOLLOW UP SPINE    8/24/2022     CHIEF COMPLAINT:  Low back pain, F/u    HISTORY OF PRESENT ILLNESS:              The patient is a 65 y.o. male history of sleep apnea initially referred by Dr. Latha Quiroga MD here to follow-up after bracing and pharmacologic care for back pain and work injury 6/23/2022. He works as a  and on this date missed a step and landed on his buttocks/back. He developed immediate aching and stabbing midline low back pain. He was seen and evaluated at the ED and diagnosed with a L1 compression fracture--confirmed with MRI. His pain was initially constant and severe. He has been wearing his back brace diligently for 7 weeks now. He currently reports 100% improvement of his back pain. He also reports improved functionality without pain. He feels that he be able to return to work. He reports relief with brace, heat and naproxen. He still denies any lower extremity radiating pain. He denies any numbness tingling or extremity weakness. Denies any recent bowel or bladder dysfunction or saddle anesthesia. Denies any back issues prior to this work injury. Past Medical History:   Diagnosis Date    Apnea     TOBY on CPAP       The pain assessment was noted & reviewed in the medical record today.      Current/Past Treatment:   Physical Therapy: Bracing since 7/6/2022  Chiropractic:     Injection:     Medications:            NSAIDS: Naproxen with benefit, DC            Muscle relaxer:              Steriods:              Neuropathic medications:              Opioids:            Other:   Surgery/Consult:    Work Status/Functionality:  has been off, light duty unavailable    Past Medical History: Medical history form was reviewed today & scanned into the media tab  Past Medical History:   Diagnosis Date    Apnea     TOBY on CPAP       Past Surgical History:     Past Surgical History:   Procedure Laterality Date    APPENDECTOMY  2/20/16    CHOLECYSTECTOMY, LAPAROSCOPIC N/A 10/09/2019    WITH CHOLANGIOGRAMS    CHOLECYSTECTOMY, LAPAROSCOPIC N/A 10/9/2019    LAPAROSCOPIC CHOLECYSTECTOMY, WITH CHOLANGIOGRAMS performed by Rico North MD at [facility]  12/09/2019    ascending colon polyp, sigmoid colon polyp    COLONOSCOPY N/A 12/9/2019    COLONOSCOPY POLYPECTOMY SNARE/COLD BIOPSY performed by Clark Tabares MD at [place] [address] N/A 12/9/2019    COLONOSCOPY CONTROL HEMORRHAGE performed by Clark Tabares MD at [facility]      T&A    TYMPANOSTOMY TUBE PLACEMENT      WISDOM TOOTH EXTRACTION       Current Medications:     Current Outpatient Medications:     meloxicam (MOBIC) 15 MG tablet, Take 1 tablet by mouth in the morning., Disp: 30 tablet, Rfl: 0    naproxen (NAPROSYN) 500 MG tablet, Take 1 tablet by mouth 2 times daily as needed for Pain With food, Disp: 30 tablet, Rfl: 0  Allergies:  Patient has no known allergies. Social History:    reports that he has never smoked. He has never used smokeless tobacco. He reports that he does not drink alcohol and does not use drugs. Family History:   Family History   Problem Relation Age of Onset    Diabetes Mother     Stroke Mother     Diabetes Father     Prostate Cancer Father 72    Coronary Art Dis Father     Diabetes Sister     Diabetes Maternal Grandfather     Diabetes Paternal Grandmother     Prostate Cancer Paternal Grandfather     Diabetes Other         cousin, type 1       REVIEW OF SYSTEMS: Full ROS reviewed & scanned into chart  CONSTITUTIONAL: Denies unexplained weight loss, fevers   SKIN: Denies active skin conditions     PHYSICAL EXAM:    Vitals:    8/24/2022 9:07 AM   Weight 220 lb (99.8 kg)   Height 5' 10\" (1.778 m)   Pain Score 0 - No pain   Pain Loc Back     GENERAL EXAM:  General Apparence: Patient is adequately groomed with no evidence of malnutrition. Orientation: The patient is oriented to time, place and person.    Mood & Affect:The patient's mood and affect are appropriate   Lymphatic: The lymphatic examination bilaterally reveals all areas to be without enlargement or induration  Sensation: Sensation is intact without deficit  Coordination/Balance: Good coordination   LUMBAR/SACRAL EXAMINATION:  Inspection: Local inspection shows no step-off or bruising. LSO  Palpation:   No evidence of tenderness at the midline. Nontender to palpation or percussion. Range of Motion: Intact flexion extension without pain today  Strength:   Strength testing is 5/5 in all muscle groups tested. Special Tests:   Straight leg raise and crossed SLR negative. Leg length and pelvis level.  0 out of 5 Graciela's signs. Skin: There are no rashes, ulcerations or lesions. Reflexes: Reflexes are symmetrically 2+ at the patellar and ankle tendons. Clonus absent bilaterally at the feet. Gait & station: Normal unassisted  Additional Examinations:   RIGHT LOWER EXTREMITY: Inspection/examination of the right lower extremity does not show any tenderness, deformity or injury. Range of motion is full. There is no gross instability. There are no rashes, ulcerations or lesions. Strength and tone are normal.  LEFT LOWER EXTREMITY:  Inspection/examination of the left lower extremity does not show any tenderness, deformity or injury. Range of motion is full. There is no gross instability. There are no rashes, ulcerations or lesions. Strength and tone are normal.    Diagnostic Testing:    Lumbar MRI scan report independently reviewed July 2022 showing a subacute L1 compression fracture with 30% loss of height. There is no evidence of retropulsion. Multilevel DDD/spondylosis with Modic changes L3-4 and L5-S1.  mild central stenosis L4-5.     Lumbar CT scan and report independently reviewed from 6/23/2022 showing L1 nondisplaced superior endplate fracture, severe L5-S1 DDD    Thoracic CT scan report dependently reviewed 6/23/2022 chronic wedging T7-T8 with DDD    ED notes reviewed        Impression:  1) Low back pain, L1 endplate fracture  2) Lumbar strain  3) Work injury 6-23-22  4) L5-S1 DDD, wedging T7-8  5) TOBY      Plan:   1) His pain has resolved at this time. He has been braced for 7 weeks total and doing very well. He wishes to return to work full duty. Declining any formal work restrictions but he states he is able to modify how much he will be lifting.   2) We discussed concerning signs and symptoms such as worsening pain, lower extremity radiating symptoms, weakness  3) I have asked him to continue wearing his brace while working  4) Cont NSAIDs PRN  5) F/u 4-6wks for re-evaluation         Kendrick Mckinnon PA-C, MPAS  Board Certified by the [facility]note 2[facility] [address]

## 2022-08-25 ENCOUNTER — TELEPHONE (OUTPATIENT)
Dept: ORTHOPEDIC SURGERY | Age: 57
End: 2022-08-25

## 2022-08-25 NOTE — TELEPHONE ENCOUNTER
General Question     Subject: WORK RESTRICTIONS RTW PAPER WORK  Patient and /or Facility Request: [facility] CALLING TO SEE IF PAPER WORK WAS COMPLETED YET. STATES PHYSICIAN HAS TO SIGN 2ND PAGE.    Contact Number:  [phone]    Purnell Sever  [phone]  EMPLOYER: Taya Levin

## 2022-09-26 ENCOUNTER — OFFICE VISIT (OUTPATIENT)
Dept: FAMILY MEDICINE CLINIC | Age: 57
End: 2022-09-26
Payer: COMMERCIAL

## 2022-09-26 VITALS
HEIGHT: 70 IN | OXYGEN SATURATION: 97 % | BODY MASS INDEX: 31.55 KG/M2 | DIASTOLIC BLOOD PRESSURE: 72 MMHG | HEART RATE: 74 BPM | WEIGHT: 220.4 LBS | SYSTOLIC BLOOD PRESSURE: 112 MMHG

## 2022-09-26 DIAGNOSIS — M25.511 ACUTE PAIN OF RIGHT SHOULDER: Primary | ICD-10-CM

## 2022-09-26 PROCEDURE — 3017F COLORECTAL CA SCREEN DOC REV: CPT | Performed by: FAMILY MEDICINE

## 2022-09-26 PROCEDURE — G8417 CALC BMI ABV UP PARAM F/U: HCPCS | Performed by: FAMILY MEDICINE

## 2022-09-26 PROCEDURE — 1036F TOBACCO NON-USER: CPT | Performed by: FAMILY MEDICINE

## 2022-09-26 PROCEDURE — G8427 DOCREV CUR MEDS BY ELIG CLIN: HCPCS | Performed by: FAMILY MEDICINE

## 2022-09-26 PROCEDURE — 99213 OFFICE O/P EST LOW 20 MIN: CPT | Performed by: FAMILY MEDICINE

## 2022-09-26 RX ORDER — CELECOXIB 200 MG/1
200 CAPSULE ORAL DAILY
Qty: 30 CAPSULE | Refills: 0 | Status: SHIPPED | OUTPATIENT
Start: 2022-09-26

## 2022-09-26 NOTE — PROGRESS NOTES
Chief Complaint   Patient presents with    Pain     Pain in right shoulder and arm       HPI:  Emily Arita is a 65 y.o. (: 1965) here today   for pain in right shoulder and arm. HPI  Pain down right shoulder, arm over past mo or so. No particular injury noted. Had not been active due to back issues. Began to have pain. Sometimes shoulder. Occas all the way down the arm. Other times elbow, forearm, biceps. Other times no pain. Occurs random times. No strain noted. Appt w/ back specialist again tomorrow [facility]). Current sxs to arm does not seem related to prior back issue. Occas tingling sensation to hand as well on the right. Patient's medications, allergies, past medical, surgical, social and family histories were reviewed and updated as appropriate. ROS:  Review of Systems   Constitutional:  Negative for fever. Musculoskeletal:  Positive for arthralgias and myalgias. Prior to Visit Medications    Medication Sig Taking? Authorizing Provider   celecoxib (CELEBREX) 200 MG capsule Take 1 capsule by mouth daily Yes Duglas Deras MD       No Known Allergies    OBJECTIVE:    /72   Pulse 74   Ht 5' 10\" (1.778 m)   Wt 220 lb 6.4 oz (100 kg)   SpO2 97%   BMI 31.62 kg/m²     BP Readings from Last 2 Encounters:   22 112/72   22 130/80       Wt Readings from Last 3 Encounters:   22 220 lb 6.4 oz (100 kg)   22 220 lb (99.8 kg)   22 220 lb (99.8 kg)       Physical Exam  Constitutional:       Appearance: Normal appearance. HENT:      Head: Normocephalic and atraumatic. Eyes:      Extraocular Movements: Extraocular movements intact. Neck:      Comments: Mild decreased range of motion of the neck, but symmetric. Pain is not elicited with movement of the neck. Cardiovascular:      Rate and Rhythm: Normal rate and regular rhythm. Pulmonary:      Effort: Pulmonary effort is normal.      Breath sounds: Normal breath sounds.    Musculoskeletal: Right shoulder: Decreased range of motion. Normal strength. Left shoulder: Normal. No tenderness. Normal range of motion. Right elbow: Normal range of motion. No tenderness. Cervical back: No spinous process tenderness or muscular tenderness. Decreased range of motion. Comments: Decreased abduction, internal and external rotation. Pain with impingement testing. No significant rotator cuff weakness noted. He does have some pain to palpation over the anterior aspect of the right shoulder. Neurological:      General: No focal deficit present. Mental Status: He is alert and oriented to person, place, and time. ASSESSMENT/PLAN:     1. Acute pain of right shoulder  I suspect either rotator cuff impingement or bursitis. He is right-handed. 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 Given his decreased range of motion and findings on exam, suspect impingement or bursitis. Does not seem to be a full-thickness rotator cuff tear as his strength seems to be intact. Patient provided with exercises. Trial of anti-inflammatory as below for 10 days to 2 weeks. If fails to improve, could consider imaging as well. - celecoxib (CELEBREX) 200 MG capsule; Take 1 capsule by mouth daily  Dispense: 30 capsule; Refill: 0    This document was prepared by a combination of typing and transcription through a voice recognition software.

## 2022-09-27 ENCOUNTER — TELEPHONE (OUTPATIENT)
Dept: ORTHOPEDIC SURGERY | Age: 57
End: 2022-09-27

## 2022-09-27 ENCOUNTER — OFFICE VISIT (OUTPATIENT)
Dept: ORTHOPEDIC SURGERY | Age: 57
End: 2022-09-27
Payer: COMMERCIAL

## 2022-09-27 VITALS — HEIGHT: 70 IN | WEIGHT: 220 LBS | BODY MASS INDEX: 31.5 KG/M2

## 2022-09-27 DIAGNOSIS — S32.010A CLOSED COMPRESSION FRACTURE OF BODY OF L1 VERTEBRA (HCC): ICD-10-CM

## 2022-09-27 DIAGNOSIS — S39.012D LUMBAR STRAIN, SUBSEQUENT ENCOUNTER: Primary | ICD-10-CM

## 2022-09-27 PROCEDURE — G8417 CALC BMI ABV UP PARAM F/U: HCPCS | Performed by: PHYSICIAN ASSISTANT

## 2022-09-27 PROCEDURE — 99213 OFFICE O/P EST LOW 20 MIN: CPT | Performed by: PHYSICIAN ASSISTANT

## 2022-09-27 PROCEDURE — G8427 DOCREV CUR MEDS BY ELIG CLIN: HCPCS | Performed by: PHYSICIAN ASSISTANT

## 2022-09-27 PROCEDURE — 1036F TOBACCO NON-USER: CPT | Performed by: PHYSICIAN ASSISTANT

## 2022-09-27 PROCEDURE — 3017F COLORECTAL CA SCREEN DOC REV: CPT | Performed by: PHYSICIAN ASSISTANT

## 2022-09-27 NOTE — TELEPHONE ENCOUNTER
Other IW STUCK IN TRAFFIC. HE'S AWARE OF THE 15 MIN QUITA PERIOD AS WELL R/S APPT IF HE'S MORE THAN 15 MIN LATE. IW STATE'S HE TRAVELS  1HR 1/2  TO GET HERE AND REALLY WOULD LIKE TO BE SEEN IF POSSIBLE.  [phone]

## 2022-09-27 NOTE — PROGRESS NOTES
FOLLOW UP SPINE    9/27/2022     CHIEF COMPLAINT:  Low back pain, F/u    HISTORY OF PRESENT ILLNESS:              The patient is a 65 y.o. male history of sleep apnea initially referred by Dr. Danita Khan MD here to follow-up after for back pain and work injury 6/23/2022. He works as a  and on this date missed a step and landed on his buttocks/back. He developed immediate aching and stabbing midline low back pain. He was seen and evaluated at the ED and diagnosed with a L1 compression fracture--confirmed with MRI. His pain was initially constant and severe. He was braced for several weeks with complete resolution of his pain. He has been back to work with no issues. He still reports 100% improvement of his back pain. He also reports improved functionality without pain. He has been able to discontinue medication. He still denies any lower extremity radiating pain. He denies any numbness tingling or extremity weakness. Denies any recent bowel or bladder dysfunction or saddle anesthesia. Denies any back issues prior to this work injury. Overall doing well. Past Medical History:   Diagnosis Date    Apnea     TOBY on CPAP       The pain assessment was noted & reviewed in the medical record today. Current/Past Treatment:   Physical Therapy: Bracing since 7/6/2022 now just as needed  Chiropractic:     Injection:     Medications:            NSAIDS: Naproxen with benefit, DC. Currently on Celebrex as needed for right shoulder pain.             Muscle relaxer:              Steriods:              Neuropathic medications:              Opioids:            Other:   Surgery/Consult: no    Work Status/Functionality: --return to work without issues    Past Medical History: Medical history form was reviewed today & scanned into the media tab  Past Medical History:   Diagnosis Date    Apnea     TOBY on CPAP       Past Surgical History:     Past Surgical History:   Procedure Laterality Date    APPENDECTOMY  2/20/16    CHOLECYSTECTOMY, LAPAROSCOPIC N/A 10/09/2019    WITH CHOLANGIOGRAMS    CHOLECYSTECTOMY, LAPAROSCOPIC N/A 10/9/2019    LAPAROSCOPIC CHOLECYSTECTOMY, WITH CHOLANGIOGRAMS [facility] [address]  12/09/2019    ascending colon polyp, sigmoid colon polyp    COLONOSCOPY N/A 12/9/2019    COLONOSCOPY POLYPECTOMY SNARE/COLD BIOPSY performed by Alfred Burnette MD at [facility] N/A 12/9/2019    COLONOSCOPY CONTROL HEMORRHAGE performed by Alfred Burnette MD at [address] [facility]      T&A    TYMPANOSTOMY TUBE PLACEMENT      WISDOM TOOTH EXTRACTION       Current Medications:     Current Outpatient Medications:     celecoxib (CELEBREX) 200 MG capsule, Take 1 capsule by mouth daily, Disp: 30 capsule, Rfl: 0  Allergies:  Patient has no known allergies. Social History:    reports that he has never smoked. He has never used smokeless tobacco. He reports that he does not drink alcohol and does not use drugs. Family History:   Family History   Problem Relation Age of Onset    Diabetes Mother     Stroke Mother     Diabetes Father     Prostate Cancer Father 72    Coronary Art Dis Father     Diabetes Sister     Diabetes Maternal Grandfather     Diabetes Paternal Grandmother     Prostate Cancer Paternal Grandfather     Diabetes Other         cousin, type 1       REVIEW OF SYSTEMS: Full ROS reviewed & scanned into chart  CONSTITUTIONAL: Denies unexplained weight loss, fevers   SKIN: Denies active skin conditions     PHYSICAL EXAM:    Vitals:    9/27/2022 10:41 AM   Weight 220 lb (99.8 kg)   Height 5' 10\" (1.778 m)   Pain Score 0 - No pain   Pain Loc Back     GENERAL EXAM:  General Apparence: Patient is adequately groomed with no evidence of malnutrition. Orientation: The patient is oriented to time, place and person.    Mood & Affect:The patient's mood and affect are appropriate   Lymphatic: The lymphatic examination bilaterally reveals all areas to be without enlargement or induration  Sensation: Sensation is intact without deficit  Coordination/Balance: Good coordination   LUMBAR/SACRAL EXAMINATION:  Inspection: Local inspection shows no step-off or bruising. Palpation:   No evidence of tenderness at the midline. Nontender to palpation or percussion. Range of Motion: Intact flexion extension without pain today  Strength:   Strength testing is 5/5 in all muscle groups tested. Special Tests:   Straight leg raise and crossed SLR negative. Leg length and pelvis level.  0 out of 5 Graciela's signs. Skin: There are no rashes, ulcerations or lesions. Reflexes: Reflexes are symmetrically 2+ at the patellar and ankle tendons. Clonus absent bilaterally at the feet. Gait & station: Normal unassisted  Additional Examinations:   RIGHT LOWER EXTREMITY: Inspection/examination of the right lower extremity does not show any tenderness, deformity or injury. Range of motion is full. There is no gross instability. There are no rashes, ulcerations or lesions. Strength and tone are normal.  LEFT LOWER EXTREMITY:  Inspection/examination of the left lower extremity does not show any tenderness, deformity or injury. Range of motion is full. There is no gross instability. There are no rashes, ulcerations or lesions. Strength and tone are normal.    Diagnostic Testing:    PCP notes reviewed    Lumbar MRI scan report independently reviewed July 2022 showing a subacute L1 compression fracture with 30% loss of height. There is no evidence of retropulsion. Multilevel DDD/spondylosis with Modic changes L3-4 and L5-S1.  mild central stenosis L4-5.     Lumbar CT scan and report independently reviewed from 6/23/2022 showing L1 nondisplaced superior endplate fracture, severe L5-S1 DDD    Thoracic CT scan report dependently reviewed 6/23/2022 chronic wedging T7-T8 with DDD    ED notes reviewed        Impression:  1) Low back pain, L1 endplate fracture  2) Lumbar strain  3) Work injury 6-23-22  4) L5-S1 DDD, wedging T7-8  5) TOBY      Plan:   1) His pain has resolved at this time. He is back to work without formal restrictions and doing very well. We did discuss bracing periodically if needed.     2) Cont NSAIDs PRN  3) We discussed concerning signs and symptoms such as worsening pain, lower extremity radiating symptoms, weakness  4) Happy to see him back as needed        Lexi Gomez PA-C, MPAS  Board Certified by the [facility] [address] [facility]

## 2022-11-28 ENCOUNTER — OFFICE VISIT (OUTPATIENT)
Dept: FAMILY MEDICINE CLINIC | Age: 57
End: 2022-11-28
Payer: COMMERCIAL

## 2022-11-28 VITALS
DIASTOLIC BLOOD PRESSURE: 84 MMHG | HEIGHT: 70 IN | HEART RATE: 56 BPM | WEIGHT: 226.4 LBS | BODY MASS INDEX: 32.41 KG/M2 | OXYGEN SATURATION: 98 % | SYSTOLIC BLOOD PRESSURE: 132 MMHG

## 2022-11-28 DIAGNOSIS — M25.511 ACUTE PAIN OF RIGHT SHOULDER: Primary | ICD-10-CM

## 2022-11-28 PROCEDURE — G8484 FLU IMMUNIZE NO ADMIN: HCPCS | Performed by: FAMILY MEDICINE

## 2022-11-28 PROCEDURE — G8417 CALC BMI ABV UP PARAM F/U: HCPCS | Performed by: FAMILY MEDICINE

## 2022-11-28 PROCEDURE — G8427 DOCREV CUR MEDS BY ELIG CLIN: HCPCS | Performed by: FAMILY MEDICINE

## 2022-11-28 PROCEDURE — 99213 OFFICE O/P EST LOW 20 MIN: CPT | Performed by: FAMILY MEDICINE

## 2022-11-28 PROCEDURE — 3017F COLORECTAL CA SCREEN DOC REV: CPT | Performed by: FAMILY MEDICINE

## 2022-11-28 PROCEDURE — 1036F TOBACCO NON-USER: CPT | Performed by: FAMILY MEDICINE

## 2022-11-28 NOTE — PROGRESS NOTES
Chief Complaint   Patient presents with    Shoulder Pain     Patient here for follow up on shoulder pain. HPI:  Arielle Burgos is a 65 y.o. (: 1965) here today   for follow up on right shoulder pain. HPI  Seen approx 2 mo ago. Celebrex at that time. Given some exercises (HEP). Did not seem to help much. Had been working around the house (lifting, hammering, saws, others). Pain not bad w/ activity, but pain more bothersome after activity. Some days worse than others. Some ache down the forearm and bicep w/ writing. Improves w/ position change for a period of time. More to ulnar aspect of forearm. No weakness noted. Is right handed. ? Dec mass to bicep, but has improved. Celebrex helped somewhat, but did not eliminate pain. Pain occas worse at night. Some dec rom noted. Less pain w/ passive rom than active rom. No sig pain in the neck. Patient's medications, allergies, past medical, surgical, social and family histories were reviewed and updated as appropriate. ROS:  Review of Systems   Constitutional:  Negative for fever. Musculoskeletal:  Positive for arthralgias. Neurological:  Negative for weakness and numbness.          Hemoglobin A1C (%)   Date Value   2019 5.6     Microscopic Examination (no units)   Date Value   2019 YES     LDL Calculated (mg/dL)   Date Value   2019 85       Past Medical History:   Diagnosis Date    Apnea     TOBY on CPAP        Family History   Problem Relation Age of Onset    Diabetes Mother     Stroke Mother     Diabetes Father     Prostate Cancer Father 72    Coronary Art Dis Father     Diabetes Sister     Diabetes Maternal Grandfather     Diabetes Paternal Grandmother     Prostate Cancer Paternal Grandfather     Diabetes Other         cousin, type 1       Social History     Socioeconomic History    Marital status:      Spouse name: Not on file    Number of children: Not on file    Years of education: Not on file    Highest education level: Not on file   Occupational History    Not on file   Tobacco Use    Smoking status: Never    Smokeless tobacco: Never   Vaping Use    Vaping Use: Never used   Substance and Sexual Activity    Alcohol use: No    Drug use: No    Sexual activity: Not Currently   Other Topics Concern    Not on file   Social History Narrative    Not on file     Social Determinants of Health     Financial Resource Strain: Not on file   Food Insecurity: Not on file   Transportation Needs: Not on file   Physical Activity: Not on file   Stress: Not on file   Social Connections: Not on file   Intimate Partner Violence: Not on file   Housing Stability: Not on file       Prior to Visit Medications    Not on File       No Known Allergies    OBJECTIVE:    /84   Pulse 56   Ht 5' 10\" (1.778 m)   Wt 226 lb 6.4 oz (102.7 kg)   SpO2 98%   BMI 32.49 kg/m²     BP Readings from Last 2 Encounters:   11/28/22 132/84   09/26/22 112/72       Wt Readings from Last 3 Encounters:   11/28/22 226 lb 6.4 oz (102.7 kg)   09/27/22 220 lb (99.8 kg)   09/26/22 220 lb 6.4 oz (100 kg)       Physical Exam  Constitutional:       Appearance: Normal appearance. HENT:      Head: Normocephalic and atraumatic. Eyes:      Extraocular Movements: Extraocular movements intact. Pulmonary:      Effort: Pulmonary effort is normal.   Musculoskeletal:      Right shoulder: Decreased range of motion. Normal strength. Left shoulder: Normal. No tenderness. Normal range of motion. Right elbow: Normal range of motion. No tenderness. Comments: Decreased abduction, internal and external rotation. Pain with impingement testing. No significant rotator cuff weakness noted. He does have some pain to palpation over the anterior aspect of the right shoulder. Neurological:      General: No focal deficit present. Mental Status: He is alert and oriented to person, place, and time.    Psychiatric:         Mood and Affect: Mood normal. Behavior: Behavior normal.         ASSESSMENT/PLAN:    1. Acute pain of right shoulder  Ongoing issues w/ pain. Sig dec rom. Only mild relief w/ HEP and celebrex. Given ongoing sxs, referral to ortho for further eval and tx. Suspect impingement vs bursitis  - [facility], Aneita Bosworth, DO, Orthopedics and Sports Medicine (Hip; Knee;  Shoulder), [place]

## 2022-12-27 ENCOUNTER — OFFICE VISIT (OUTPATIENT)
Dept: ORTHOPEDIC SURGERY | Age: 57
End: 2022-12-27

## 2022-12-27 VITALS — WEIGHT: 226 LBS | BODY MASS INDEX: 32.35 KG/M2 | HEIGHT: 70 IN

## 2022-12-27 DIAGNOSIS — M75.81 TENDINITIS OF RIGHT ROTATOR CUFF: ICD-10-CM

## 2022-12-27 DIAGNOSIS — M25.511 RIGHT SHOULDER PAIN, UNSPECIFIED CHRONICITY: Primary | ICD-10-CM

## 2022-12-27 RX ORDER — CELECOXIB 100 MG/1
100 CAPSULE ORAL 2 TIMES DAILY
COMMUNITY

## 2022-12-27 NOTE — PROGRESS NOTES
Date:  2022    Name:  Philly Barrera  Address:  [address] [place] [id number]    :  1965      Age:   65 y.o.    SSN:  [id number]      Medical Record Number:  [id number]    Reason for Visit:    Chief Complaint    Shoulder Pain (NP Rt shoulder)      DOS:2022     HPI: Jesus Yip is a 65 y.o. male here today for new patient evaluation regarding his right shoulder. The patient was sent to me by Dr. Norma Ramsey. The patient is right-hand dominant. The patient reports 2 to 3 months of worsening right shoulder pain. He was started on a home program and he was started on Celebrex which both have helped his pain. He denies any injury that started his pain. He just woke up with the pain. The patient is a very active viri and has done a lot of labor-intensive work throughout his life. He is currently a  and uses his right arm to shift a lot. He denies any diabetes. He denies any blood thinner use or other medical conditions. He denies major loss of range of motion. Pain Assessment  Location of Pain: Shoulder  Location Modifiers: Right  Severity of Pain: 1  Quality of Pain: Sharp  Duration of Pain: Persistent  Frequency of Pain: Constant  Aggravating Factors: Other (Comment) (ER/IR)  Limiting Behavior: Yes  Relieving Factors: Other (Comment) (celebrex)  Result of Injury: No  Work-Related Injury: No  Are there other pain locations you wish to document?: No  ROS: All systems reviewed on patient intake form. Pertinent items are noted in HPI.         Past Medical History:   Diagnosis Date    Apnea     TOBY on CPAP         Past Surgical History:   Procedure Laterality Date    APPENDECTOMY  17    CHOLECYSTECTOMY, LAPAROSCOPIC N/A 10/09/2019    WITH CHOLANGIOGRAMS    CHOLECYSTECTOMY, LAPAROSCOPIC N/A 10/9/2019    LAPAROSCOPIC CHOLECYSTECTOMY, WITH CHOLANGIOGRAMS performed by Fina Matthews MD at [address]  2019    ascending colon polyp, sigmoid colon polyp    COLONOSCOPY N/A 12/9/2019    COLONOSCOPY POLYPECTOMY SNARE/COLD BIOPSY performed by Carley Vital MD at [address] N/A 12/9/2019    COLONOSCOPY CONTROL HEMORRHAGE performed by Carley Vital MD at [facility]      T&A    TYMPANOSTOMY TUBE PLACEMENT      WISDOM TOOTH EXTRACTION         Family History   Problem Relation Age of Onset    Diabetes Mother     Stroke Mother     Diabetes Father     Prostate Cancer Father 72    Coronary Art Dis Father     Diabetes Sister     Diabetes Maternal Grandfather     Diabetes Paternal Grandmother     Prostate Cancer Paternal Grandfather     Diabetes Other         cousin, type 1       Social History     Socioeconomic History    Marital status:    Tobacco Use    Smoking status: Never    Smokeless tobacco: Never   Vaping Use    Vaping Use: Never used   Substance and Sexual Activity    Alcohol use: No    Drug use: No    Sexual activity: Not Currently       Current Outpatient Medications   Medication Sig Dispense Refill    celecoxib (CELEBREX) 100 MG capsule Take 100 mg by mouth 2 times daily       No current facility-administered medications for this visit. No Known Allergies    Vital signs:  Ht 5' 10\" (1.778 m)   Wt 226 lb (102.5 kg)   BMI 32.43 kg/m²      Right shoulder exam    Inspection:  Held in a normal posture. Normal contour at the acromioclavicular joint. No swelling, ecchymosis, or erythema about the shoulder. No atrophy appreciated. No scapular winging. Palpation:  No subacromial crepitus. Very mild tenderness to deep palpation over the [facility] joint. No tenderness to the bicipital groove. No tenderness about the cervical spine. Range of Motion: Full passive and active ROM. Normal scapulothoracic rhythm. Strength:  Normal supraspinatus, infraspinatus, and subscapularis muscle strength. Stability: No anterior instability. No posterior instability.     Special Tests: Impingement findings are negative. Labral findings are negative. Speed sign and Yergason signs are both negative. Crossover sign is negative. Belly press sign is negative. Lift off sign is negative. Other findings: The skin is warm dry and well perfused. 2+ radial pulse. Sensation is intact to light touch over the deltoid. Negative Spurling. Left comparison shoulder exam    Inspection:  Held in a normal posture. Normal contour at the acromioclavicular joint. No swelling, ecchymosis, or erythema about the shoulder. No atrophy appreciated. No scapular winging. Palpation:  No subacromial crepitus. No tenderness of the AC joint. No greater tuberosity tenderness. No tenderness in the bicipital groove. Range of Motion: Full passive and active ROM. Normal scapulothoracic rhythm. Strength:  Normal supraspinatus, infraspinatus, and subscapularis muscle strength. Stability: No anterior instability. No posterior instability. Special Tests: Impingement findings are negative. Labral findings are negative. Speed sign and Yergason signs are both negative. Crossover sign is negative. Belly press sign is negative. Lift off sign is negative. Other findings: The skin is warm dry and well perfused. 2+ radial pulse. Sensation is intact to light touch over the deltoid. Diagnostics:  Radiology:       XR Right Shoulder Findings:     Views:  3 views R shoulder  Weight bearing: No  Findings: 3 views of the R shoulder taken in the office today and interpreted by me demonstrate no acute osseous abnormalities. Well-maintained joint space to the glenohumeral joint and severe arthritis to the acromioclavicular joint. Concentric alignment of the glenohumeral joint. No fractures, dislocations, or radio-opaque foreign bodies noted. Appropriate humeral head height with normal acromiohumeral interval. Lung fields demonstrate no apparent masses or calcifications.   Previous comparison films: None    Impression:  No acute osseous abnormalities Right shoulder        Assessment: 65 y.o. male with asymptomatic right shoulder AC joint arthritis, rotator cuff tendinitis versus early onset glenohumeral osteoarthritis    Plan: Pertinent imaging was reviewed. The etiology, natural history, and treatment options for the disorder were discussed. The roles of activity medication, antiinflammatories, injections, bracing, physical therapy, and surgical interventions were all described to the patient and questions were answered. Overall the patient has a very good shoulder with good strength and range of motion. The x-ray does demonstrate a potential small spur at the inferior aspect of the humeral head which could be a sign of early arthritis. However his overall exam is encouraging. I will get the patient continued on his home exercise program and continue with the Celebrex per Dr. Austin Campos. He does demonstrate improvement so I do not think we need to do a steroid shot today. The patient can follow-up with me as needed if he does not get better in the next month or 2.    . Del Rosario Steve is in agreement with this plan. All questions were answered to patient's satisfaction and was encouraged to call with any further questions.        Jena Marin,   Orthopedic Surgery and Sports Medicine  12/27/2022    Orders Placed This Encounter   Procedures    XR SHOULDER RIGHT (MIN 2 VIEWS)     Standing Status:   Future     Number of Occurrences:   1     Standing Expiration Date:   12/22/2023     Order Specific Question:   Reason for exam:     Answer:   PAIN

## 2023-05-24 ENCOUNTER — OFFICE VISIT (OUTPATIENT)
Dept: FAMILY MEDICINE CLINIC | Age: 58
End: 2023-05-24
Payer: COMMERCIAL

## 2023-05-24 VITALS
HEART RATE: 58 BPM | DIASTOLIC BLOOD PRESSURE: 70 MMHG | BODY MASS INDEX: 30.42 KG/M2 | SYSTOLIC BLOOD PRESSURE: 118 MMHG | OXYGEN SATURATION: 97 % | WEIGHT: 212 LBS

## 2023-05-24 DIAGNOSIS — H65.193 ACUTE MEE (MIDDLE EAR EFFUSION), BILATERAL: ICD-10-CM

## 2023-05-24 DIAGNOSIS — H92.03 OTALGIA OF BOTH EARS: ICD-10-CM

## 2023-05-24 DIAGNOSIS — R42 VERTIGO: ICD-10-CM

## 2023-05-24 DIAGNOSIS — J01.90 ACUTE SINUSITIS, RECURRENCE NOT SPECIFIED, UNSPECIFIED LOCATION: Primary | ICD-10-CM

## 2023-05-24 PROCEDURE — 99214 OFFICE O/P EST MOD 30 MIN: CPT

## 2023-05-24 PROCEDURE — 1036F TOBACCO NON-USER: CPT

## 2023-05-24 PROCEDURE — G8417 CALC BMI ABV UP PARAM F/U: HCPCS

## 2023-05-24 PROCEDURE — 3017F COLORECTAL CA SCREEN DOC REV: CPT

## 2023-05-24 PROCEDURE — G8427 DOCREV CUR MEDS BY ELIG CLIN: HCPCS

## 2023-05-24 RX ORDER — METHYLPREDNISOLONE 4 MG/1
TABLET ORAL
Qty: 1 KIT | Refills: 0 | Status: SHIPPED | OUTPATIENT
Start: 2023-05-24 | End: 2023-05-30

## 2023-05-24 RX ORDER — AMOXICILLIN AND CLAVULANATE POTASSIUM 875; 125 MG/1; MG/1
1 TABLET, FILM COATED ORAL 2 TIMES DAILY
Qty: 20 TABLET | Refills: 0 | Status: SHIPPED | OUTPATIENT
Start: 2023-05-24 | End: 2023-06-03

## 2023-05-24 RX ORDER — MECLIZINE HYDROCHLORIDE 25 MG/1
25 TABLET ORAL 3 TIMES DAILY PRN
Qty: 30 TABLET | Refills: 0 | Status: SHIPPED | OUTPATIENT
Start: 2023-05-24 | End: 2023-06-03

## 2023-05-24 SDOH — ECONOMIC STABILITY: HOUSING INSECURITY
IN THE LAST 12 MONTHS, WAS THERE A TIME WHEN YOU DID NOT HAVE A STEADY PLACE TO SLEEP OR SLEPT IN A SHELTER (INCLUDING NOW)?: NO

## 2023-05-24 SDOH — ECONOMIC STABILITY: FOOD INSECURITY: WITHIN THE PAST 12 MONTHS, YOU WORRIED THAT YOUR FOOD WOULD RUN OUT BEFORE YOU GOT MONEY TO BUY MORE.: NEVER TRUE

## 2023-05-24 SDOH — ECONOMIC STABILITY: FOOD INSECURITY: WITHIN THE PAST 12 MONTHS, THE FOOD YOU BOUGHT JUST DIDN'T LAST AND YOU DIDN'T HAVE MONEY TO GET MORE.: NEVER TRUE

## 2023-05-24 SDOH — ECONOMIC STABILITY: INCOME INSECURITY: HOW HARD IS IT FOR YOU TO PAY FOR THE VERY BASICS LIKE FOOD, HOUSING, MEDICAL CARE, AND HEATING?: NOT HARD AT ALL

## 2023-05-24 ASSESSMENT — PATIENT HEALTH QUESTIONNAIRE - PHQ9
SUM OF ALL RESPONSES TO PHQ QUESTIONS 1-9: 0
1. LITTLE INTEREST OR PLEASURE IN DOING THINGS: 0
2. FEELING DOWN, DEPRESSED OR HOPELESS: 0
SUM OF ALL RESPONSES TO PHQ QUESTIONS 1-9: 0
SUM OF ALL RESPONSES TO PHQ9 QUESTIONS 1 & 2: 0
SUM OF ALL RESPONSES TO PHQ QUESTIONS 1-9: 0
SUM OF ALL RESPONSES TO PHQ QUESTIONS 1-9: 0

## 2023-05-24 ASSESSMENT — ENCOUNTER SYMPTOMS
RHINORRHEA: 0
RESPIRATORY NEGATIVE: 1
GASTROINTESTINAL NEGATIVE: 1
SINUS PRESSURE: 1
SINUS PAIN: 1

## 2023-05-24 NOTE — PROGRESS NOTES
Chief Complaint   Patient presents with    Otalgia     Sinus congestion, x 4 weeks        HPI:  Tomeka Harrington is a 65 y.o. (: 1965) here today   for evaluation of sinus congestion and bilateral ear pain x4 weeks. Mainly plugged up and not able to expel very much sinus discharge. Denies fever. Having some dizziness with symptoms. When lays down feels like room spins. Dizzy symptoms started a couple weeks after sinus congestion. Is not taking any OCT meds to help with symptoms. Needs updated labs but is going to get done at [facility] in . Patient's medications, allergies, past medical, surgical, social and family histories were reviewed and updated asappropriate. ROS:  Review of Systems   Constitutional: Negative. HENT:  Positive for congestion, ear pain, sinus pressure and sinus pain. Negative for ear discharge and rhinorrhea. Respiratory: Negative. Cardiovascular: Negative. Gastrointestinal: Negative. Neurological:  Positive for dizziness and light-headedness. Psychiatric/Behavioral: Negative. Prior to Visit Medications    Medication Sig Taking? Authorizing Provider   amoxicillin-clavulanate (AUGMENTIN) 875-125 MG per tablet Take 1 tablet by mouth 2 times daily for 10 days Yes Licha Anne, APRN - CNP   meclizine (ANTIVERT) 25 MG tablet Take 1 tablet by mouth 3 times daily as needed for Dizziness Yes Licha Anne APRN - CNP   methylPREDNISolone (MEDROL DOSEPACK) 4 MG tablet Take by mouth.  Yes Licha Omid, APRN - CNP   celecoxib (CELEBREX) 100 MG capsule Take 1 capsule by mouth 2 times daily  Patient not taking: Reported on 2023  Historical Provider, MD       No Known Allergies    OBJECTIVE:    /70   Pulse 58   Wt 212 lb (96.2 kg)   SpO2 97%   BMI 30.42 kg/m²     BP Readings from Last 2 Encounters:   23 118/70   22 132/84       Wt Readings from Last 3 Encounters:   23 212 lb (96.2 kg)   22 226 lb (102.5

## 2023-09-15 ENCOUNTER — TELEPHONE (OUTPATIENT)
Dept: FAMILY MEDICINE CLINIC | Age: 58
End: 2023-09-15
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
## 2023-09-15 DIAGNOSIS — H65.193 ACUTE MEE (MIDDLE EAR EFFUSION), BILATERAL: ICD-10-CM
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
## 2023-09-15 DIAGNOSIS — S40.861A TICK BITE OF RIGHT UPPER ARM, INITIAL ENCOUNTER: ICD-10-CM

## 2023-09-15 DIAGNOSIS — R09.81 SINUS CONGESTION: ICD-10-CM

## 2023-09-15 RX ORDER — DOXYCYCLINE HYCLATE 100 MG
100 TABLET ORAL 2 TIMES DAILY
Qty: 20 TABLET | Refills: 0 | Status: CANCELLED | OUTPATIENT
Start: 2023-09-15 | End: 2023-09-25

## 2023-09-15 RX ORDER — DOXYCYCLINE HYCLATE 100 MG
100 TABLET ORAL 2 TIMES DAILY
Qty: 20 TABLET | Refills: 0 | Status: SHIPPED | OUTPATIENT
Start: 2023-09-15 | End: 2023-09-25

## 2023-09-15 NOTE — TELEPHONE ENCOUNTER
I sent doxycycline to the patient's pharmacy. Will need to be seen in office for evaluation and blood work if symptoms fail to improve or worsen.

## 2023-09-15 NOTE — TELEPHONE ENCOUNTER
Insect Bite  (Newest Message First)  View All Conversations on this Encounter  Maribell Castro MA routed conversation to You 7 minutes ago (9:17 AM)     Zulma Osman MA 7 minutes ago (9:17 AM)     LS  Pt had tick bite on left shin area about a week ago, same sx as before, red, hard spot, and he is wondering if you could give him an antibiotic like before? [id number] if you want to refill it. Note         No Known Allergies  Active Ambulatory Problems     Diagnosis Date Noted    TOBY on CPAP 06/07/2018    Acute pancreatitis 09/28/2019    Class 1 obesity without serious comorbidity with body mass index (BMI) of 33.0 to 33.9 in adult     Gallstone pancreatitis     Positive colorectal cancer screening using Cologuard test 11/13/2019    Sigmoid polyp 12/09/2019    Polyp of ascending colon 12/09/2019     Resolved Ambulatory Problems     Diagnosis Date Noted    Appendicitis 02/20/2016    Abdominal pain, epigastric      Past Medical History:   Diagnosis Date    Apnea      Current Outpatient Medications   Medication Sig Dispense Refill    celecoxib (CELEBREX) 100 MG capsule Take 1 capsule by mouth 2 times daily (Patient not taking: Reported on 5/24/2023)       No current facility-administered medications for this visit. Lab Results   Component Value Date     06/05/2023    K 4.5 06/05/2023     06/05/2023    CO2 31 (H) 06/05/2023    BUN 17 06/05/2023    CREATININE 1.0 06/05/2023    GLUCOSE 95 06/05/2023    CALCIUM 8.5 (L) 06/05/2023    PROT 7.5 06/05/2023    LABALBU 4.0 06/05/2023    BILITOT 0.9 06/05/2023    ALKPHOS 42 06/05/2023    AST 24 06/05/2023    ALT 19 06/05/2023    LABGLOM >60 09/29/2019    GFRAA >60 09/29/2019    AGRATIO 1.2 06/05/2023    GLOB 3.5 06/05/2023       1. Infected tick bite, initial encounter  See above patient message. No availability to be seen today. Kidney function stable at this time. Can trial the patient on doxycycline at this time.   Will

## 2023-09-15 NOTE — TELEPHONE ENCOUNTER
Pt had tick bite on left shin area about a week ago, same sx as before, red, hard spot, and he is wondering if you could give him an antibiotic like before? [id number] if you want to refill it.

## 2023-11-17 ENCOUNTER — OFFICE VISIT (OUTPATIENT)
Dept: FAMILY MEDICINE CLINIC | Age: 58
End: 2023-11-17
Payer: COMMERCIAL

## 2023-11-17 VITALS
DIASTOLIC BLOOD PRESSURE: 82 MMHG | HEART RATE: 72 BPM | HEIGHT: 70 IN | WEIGHT: 218 LBS | SYSTOLIC BLOOD PRESSURE: 118 MMHG | BODY MASS INDEX: 31.21 KG/M2 | OXYGEN SATURATION: 97 %

## 2023-11-17 DIAGNOSIS — J01.00 ACUTE MAXILLARY SINUSITIS, RECURRENCE NOT SPECIFIED: Primary | ICD-10-CM

## 2023-11-17 PROCEDURE — 3017F COLORECTAL CA SCREEN DOC REV: CPT | Performed by: FAMILY MEDICINE

## 2023-11-17 PROCEDURE — G8484 FLU IMMUNIZE NO ADMIN: HCPCS | Performed by: FAMILY MEDICINE

## 2023-11-17 PROCEDURE — 99213 OFFICE O/P EST LOW 20 MIN: CPT | Performed by: FAMILY MEDICINE

## 2023-11-17 PROCEDURE — G8427 DOCREV CUR MEDS BY ELIG CLIN: HCPCS | Performed by: FAMILY MEDICINE

## 2023-11-17 PROCEDURE — G8417 CALC BMI ABV UP PARAM F/U: HCPCS | Performed by: FAMILY MEDICINE

## 2023-11-17 PROCEDURE — 1036F TOBACCO NON-USER: CPT | Performed by: FAMILY MEDICINE

## 2023-11-17 RX ORDER — AMOXICILLIN 875 MG/1
875 TABLET, COATED ORAL 2 TIMES DAILY
Qty: 14 TABLET | Refills: 0 | Status: SHIPPED | OUTPATIENT
Start: 2023-11-17 | End: 2023-11-24

## 2023-11-17 ASSESSMENT — ENCOUNTER SYMPTOMS
SORE THROAT: 1
SHORTNESS OF BREATH: 0
SINUS PRESSURE: 1
SHORTNESS OF BREATH: 1
COUGH: 1
CONSTIPATION: 0
DIARRHEA: 0
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
## 2024-06-11 DIAGNOSIS — S40.861A TICK BITE OF RIGHT UPPER ARM, INITIAL ENCOUNTER: Primary | ICD-10-CM

## 2024-06-11 RX ORDER — DOXYCYCLINE HYCLATE 100 MG
100 TABLET ORAL 2 TIMES DAILY
Qty: 20 TABLET | Refills: 0 | Status: SHIPPED | OUTPATIENT
Start: 2024-06-11 | End: 2024-06-21

## 2024-06-11 NOTE — TELEPHONE ENCOUNTER
Pt has several tick bites, inflammed, itchy. He said you gave him some meds this time last year that helped and was wondering if you could send it in again? Pt uses [facility]

## 2024-06-11 NOTE — TELEPHONE ENCOUNTER
Appears was given doxy 100mg po bid x 10d.  Helps cover for any potential lyme exposure.  Will make him more sensitive to the sun, so take approp precautions.

## 2024-06-25 LAB
ALBUMIN SERPL-MCNC: 4.2 G/DL
ALP BLD-CCNC: 43 U/L
ALT SERPL-CCNC: 20 U/L
ANION GAP SERPL CALCULATED.3IONS-SCNC: 8.3 MMOL/L
AST SERPL-CCNC: 22 U/L
BASOPHILS ABSOLUTE: NORMAL
BASOPHILS RELATIVE PERCENT: NORMAL
BILIRUB SERPL-MCNC: 1.2 MG/DL (ref 0.1–1.4)
BUN BLDV-MCNC: 14 MG/DL
CALCIUM SERPL-MCNC: 9.1 MG/DL
CHLORIDE BLD-SCNC: 105 MMOL/L
CHOLESTEROL, TOTAL: 169 MG/DL
CHOLESTEROL/HDL RATIO: 2.5
CO2: 30 MMOL/L
CREAT SERPL-MCNC: 1.1 MG/DL
EGFR: 69
EOSINOPHILS ABSOLUTE: NORMAL
EOSINOPHILS RELATIVE PERCENT: NORMAL
ESTIMATED AVERAGE GLUCOSE: 108
GLUCOSE BLD-MCNC: 99 MG/DL
HBA1C MFR BLD: 5.4 %
HCT VFR BLD CALC: 43.3 % (ref 41–53)
HDLC SERPL-MCNC: 44 MG/DL (ref 35–70)
HEMOGLOBIN: 14.8 G/DL (ref 13.5–17.5)
LDL CHOLESTEROL CALCULATED: 112 MG/DL (ref 0–160)
LYMPHOCYTES ABSOLUTE: NORMAL
LYMPHOCYTES RELATIVE PERCENT: NORMAL
MCH RBC QN AUTO: 30.6 PG
MCHC RBC AUTO-ENTMCNC: 34.2 G/DL
MCV RBC AUTO: 89.6 FL
MONOCYTES ABSOLUTE: NORMAL
MONOCYTES RELATIVE PERCENT: NORMAL
NEUTROPHILS ABSOLUTE: NORMAL
NEUTROPHILS RELATIVE PERCENT: NORMAL
NONHDLC SERPL-MCNC: NORMAL MG/DL
PLATELET # BLD: 177 K/ΜL
PMV BLD AUTO: NORMAL FL
POTASSIUM SERPL-SCNC: 4.3 MMOL/L
PROSTATE SPECIFIC ANTIGEN: 0.5 NG/ML
RBC # BLD: 4.83 10^6/ΜL
SODIUM BLD-SCNC: 139 MMOL/L
TOTAL PROTEIN: 7.2
TRIGL SERPL-MCNC: 65 MG/DL
TSH SERPL DL<=0.05 MIU/L-ACNC: 1.75 UIU/ML
VLDLC SERPL CALC-MCNC: 13 MG/DL
WBC # BLD: 6.2 10^3/ML

## 2024-12-13 ENCOUNTER — OFFICE VISIT (OUTPATIENT)
Dept: FAMILY MEDICINE CLINIC | Age: 59
End: 2024-12-13
Payer: COMMERCIAL

## 2024-12-13 VITALS
DIASTOLIC BLOOD PRESSURE: 86 MMHG | HEART RATE: 72 BPM | SYSTOLIC BLOOD PRESSURE: 118 MMHG | WEIGHT: 213 LBS | BODY MASS INDEX: 30.49 KG/M2 | OXYGEN SATURATION: 96 % | HEIGHT: 70 IN

## 2024-12-13 DIAGNOSIS — Z86.0100 PERSONAL HISTORY OF COLON POLYPS, UNSPECIFIED: ICD-10-CM

## 2024-12-13 DIAGNOSIS — E86.0 DEHYDRATION: ICD-10-CM

## 2024-12-13 DIAGNOSIS — G47.33 OSA ON CPAP: ICD-10-CM

## 2024-12-13 DIAGNOSIS — R20.0 ARM NUMBNESS: Primary | ICD-10-CM

## 2024-12-13 DIAGNOSIS — K63.5 POLYP OF ASCENDING COLON, UNSPECIFIED TYPE: ICD-10-CM

## 2024-12-13 PROBLEM — K85.90 ACUTE PANCREATITIS: Status: RESOLVED | Noted: 2019-09-28 | Resolved: 2024-12-13

## 2024-12-13 PROCEDURE — G8484 FLU IMMUNIZE NO ADMIN: HCPCS | Performed by: FAMILY MEDICINE

## 2024-12-13 PROCEDURE — 3017F COLORECTAL CA SCREEN DOC REV: CPT | Performed by: FAMILY MEDICINE

## 2024-12-13 PROCEDURE — 1036F TOBACCO NON-USER: CPT | Performed by: FAMILY MEDICINE

## 2024-12-13 PROCEDURE — G8417 CALC BMI ABV UP PARAM F/U: HCPCS | Performed by: FAMILY MEDICINE

## 2024-12-13 PROCEDURE — 99213 OFFICE O/P EST LOW 20 MIN: CPT | Performed by: FAMILY MEDICINE

## 2024-12-13 PROCEDURE — G8427 DOCREV CUR MEDS BY ELIG CLIN: HCPCS | Performed by: FAMILY MEDICINE

## 2024-12-13 SDOH — ECONOMIC STABILITY: FOOD INSECURITY: WITHIN THE PAST 12 MONTHS, YOU WORRIED THAT YOUR FOOD WOULD RUN OUT BEFORE YOU GOT MONEY TO BUY MORE.: NEVER TRUE

## 2024-12-13 SDOH — ECONOMIC STABILITY: INCOME INSECURITY: HOW HARD IS IT FOR YOU TO PAY FOR THE VERY BASICS LIKE FOOD, HOUSING, MEDICAL CARE, AND HEATING?: NOT HARD AT ALL

## 2024-12-13 SDOH — ECONOMIC STABILITY: FOOD INSECURITY: WITHIN THE PAST 12 MONTHS, THE FOOD YOU BOUGHT JUST DIDN'T LAST AND YOU DIDN'T HAVE MONEY TO GET MORE.: NEVER TRUE

## 2024-12-13 ASSESSMENT — PATIENT HEALTH QUESTIONNAIRE - PHQ9
1. LITTLE INTEREST OR PLEASURE IN DOING THINGS: NOT AT ALL
2. FEELING DOWN, DEPRESSED OR HOPELESS: NOT AT ALL
SUM OF ALL RESPONSES TO PHQ9 QUESTIONS 1 & 2: 0
SUM OF ALL RESPONSES TO PHQ QUESTIONS 1-9: 0

## 2024-12-13 ASSESSMENT — ENCOUNTER SYMPTOMS: SHORTNESS OF BREATH: 0

## 2024-12-13 NOTE — PROGRESS NOTES
from Last 3 Encounters:   12/13/24 96.6 kg (213 lb)   11/17/23 98.9 kg (218 lb)   06/14/23 96.6 kg (213 lb)       Physical Exam  Constitutional:       Appearance: Normal appearance.   HENT:      Head: Normocephalic and atraumatic.   Eyes:      Extraocular Movements: Extraocular movements intact.   Cardiovascular:      Rate and Rhythm: Normal rate and regular rhythm.   Pulmonary:      Effort: Pulmonary effort is normal.      Breath sounds: Normal breath sounds.   Neurological:      General: No focal deficit present.      Mental Status: He is alert and oriented to person, place, and time.   Psychiatric:         Mood and Affect: Mood normal.         Behavior: Behavior normal.           ASSESSMENT/PLAN:    1. Arm numbness  Overall improved. Some sxs to right arm as well.  No cp noted.  EKG neg per pt.  Trop neg.  May be neck issues. If ongoing sxs, consider imaging of neck.  If any cardiac sxs develop, consider angiogram    2. Dehydration  Per ER note.  Sxs improved    3. Polyp of ascending colon, unspecified type  Due for rpt.  Referral placed  - Que Yanez MD, Gastroenterology, [facility]    4. Personal history of colon polyps, unspecified    - Que Yanez MD, Gastroenterology, [facility]    5. TOBY on CPAP  Cont cpap. Mary Alice well.  Seeing benefit.      Congratulated on lifestyle changes (inc activity, diet changes, others)

## 2025-03-10 ENCOUNTER — ANESTHESIA (OUTPATIENT)
Dept: ENDOSCOPY | Age: 60
End: 2025-03-10
Payer: COMMERCIAL

## 2025-03-10 ENCOUNTER — HOSPITAL ENCOUNTER (OUTPATIENT)
Age: 60
Setting detail: OUTPATIENT SURGERY
Discharge: HOME | End: 2025-03-10
Attending: INTERNAL MEDICINE | Admitting: INTERNAL MEDICINE
Payer: COMMERCIAL

## 2025-03-10 ENCOUNTER — ANESTHESIA EVENT (OUTPATIENT)
Dept: ENDOSCOPY | Age: 60
End: 2025-03-10
Payer: COMMERCIAL

## 2025-03-10 VITALS
OXYGEN SATURATION: 100 % | BODY MASS INDEX: 30.78 KG/M2 | WEIGHT: 215 LBS | HEIGHT: 70 IN | SYSTOLIC BLOOD PRESSURE: 130 MMHG | DIASTOLIC BLOOD PRESSURE: 88 MMHG | RESPIRATION RATE: 14 BRPM | TEMPERATURE: 97.1 F | HEART RATE: 65 BPM

## 2025-03-10 DIAGNOSIS — Z12.11 SPECIAL SCREENING FOR MALIGNANT NEOPLASMS, COLON: ICD-10-CM

## 2025-03-10 PROCEDURE — 7100000010 HC PHASE II RECOVERY - FIRST 15 MIN: Performed by: INTERNAL MEDICINE

## 2025-03-10 PROCEDURE — 6360000002 HC RX W HCPCS: Performed by: NURSE ANESTHETIST, CERTIFIED REGISTERED

## 2025-03-10 PROCEDURE — 2580000003 HC RX 258: Performed by: NURSE ANESTHETIST, CERTIFIED REGISTERED

## 2025-03-10 PROCEDURE — C1889 IMPLANT/INSERT DEVICE, NOC: HCPCS | Performed by: INTERNAL MEDICINE

## 2025-03-10 PROCEDURE — 7100000011 HC PHASE II RECOVERY - ADDTL 15 MIN: Performed by: INTERNAL MEDICINE

## 2025-03-10 PROCEDURE — 3700000001 HC ADD 15 MINUTES (ANESTHESIA): Performed by: INTERNAL MEDICINE

## 2025-03-10 PROCEDURE — 3609010200 HC COLONOSCOPY ABLATION TUMOR POLYP/OTHER LES: Performed by: INTERNAL MEDICINE

## 2025-03-10 PROCEDURE — 2709999900 HC NON-CHARGEABLE SUPPLY: Performed by: INTERNAL MEDICINE

## 2025-03-10 PROCEDURE — 88305 TISSUE EXAM BY PATHOLOGIST: CPT

## 2025-03-10 PROCEDURE — 3700000000 HC ANESTHESIA ATTENDED CARE: Performed by: INTERNAL MEDICINE

## 2025-03-10 PROCEDURE — 3609010600 HC COLONOSCOPY POLYPECTOMY SNARE/COLD BIOPSY: Performed by: INTERNAL MEDICINE

## 2025-03-10 PROCEDURE — 3609009900 HC COLONOSCOPY W/CONTROL BLEEDING ANY METHOD: Performed by: INTERNAL MEDICINE

## 2025-03-10 PROCEDURE — 3609019800 HC COLONOSCOPY WITH SUBMUCOSAL INJECTION: Performed by: INTERNAL MEDICINE

## 2025-03-10 DEVICE — WORKING LENGTH 235CM, WORKING CHANNEL 2.8MM
Type: IMPLANTABLE DEVICE | Status: FUNCTIONAL
Brand: RESOLUTION 360 CLIP

## 2025-03-10 RX ORDER — SODIUM CHLORIDE 9 MG/ML
INJECTION, SOLUTION INTRAVENOUS PRN
Status: DISCONTINUED | OUTPATIENT
Start: 2025-03-10 | End: 2025-03-10 | Stop reason: HOSPADM

## 2025-03-10 RX ORDER — DIPHENHYDRAMINE HYDROCHLORIDE 50 MG/ML
12.5 INJECTION INTRAMUSCULAR; INTRAVENOUS
Status: DISCONTINUED | OUTPATIENT
Start: 2025-03-10 | End: 2025-03-10 | Stop reason: HOSPADM

## 2025-03-10 RX ORDER — NALOXONE HYDROCHLORIDE 0.4 MG/ML
INJECTION, SOLUTION INTRAMUSCULAR; INTRAVENOUS; SUBCUTANEOUS PRN
Status: DISCONTINUED | OUTPATIENT
Start: 2025-03-10 | End: 2025-03-10 | Stop reason: HOSPADM

## 2025-03-10 RX ORDER — LIDOCAINE HYDROCHLORIDE 20 MG/ML
INJECTION, SOLUTION EPIDURAL; INFILTRATION; INTRACAUDAL; PERINEURAL
Status: DISCONTINUED | OUTPATIENT
Start: 2025-03-10 | End: 2025-03-10 | Stop reason: SDUPTHER

## 2025-03-10 RX ORDER — SODIUM CHLORIDE, SODIUM LACTATE, POTASSIUM CHLORIDE, CALCIUM CHLORIDE 600; 310; 30; 20 MG/100ML; MG/100ML; MG/100ML; MG/100ML
INJECTION, SOLUTION INTRAVENOUS CONTINUOUS
Status: DISCONTINUED | OUTPATIENT
Start: 2025-03-10 | End: 2025-03-10 | Stop reason: HOSPADM

## 2025-03-10 RX ORDER — SODIUM CHLORIDE 0.9 % (FLUSH) 0.9 %
5-40 SYRINGE (ML) INJECTION EVERY 12 HOURS SCHEDULED
Status: DISCONTINUED | OUTPATIENT
Start: 2025-03-10 | End: 2025-03-10 | Stop reason: HOSPADM

## 2025-03-10 RX ORDER — SODIUM CHLORIDE 0.9 % (FLUSH) 0.9 %
5-40 SYRINGE (ML) INJECTION PRN
Status: DISCONTINUED | OUTPATIENT
Start: 2025-03-10 | End: 2025-03-10 | Stop reason: HOSPADM

## 2025-03-10 RX ORDER — PROPOFOL 10 MG/ML
INJECTION, EMULSION INTRAVENOUS
Status: DISCONTINUED | OUTPATIENT
Start: 2025-03-10 | End: 2025-03-10 | Stop reason: SDUPTHER

## 2025-03-10 RX ORDER — ONDANSETRON 2 MG/ML
4 INJECTION INTRAMUSCULAR; INTRAVENOUS
Status: DISCONTINUED | OUTPATIENT
Start: 2025-03-10 | End: 2025-03-10 | Stop reason: HOSPADM

## 2025-03-10 RX ORDER — OXYCODONE HYDROCHLORIDE 5 MG/1
5 TABLET ORAL PRN
Status: DISCONTINUED | OUTPATIENT
Start: 2025-03-10 | End: 2025-03-10 | Stop reason: HOSPADM

## 2025-03-10 RX ORDER — LIDOCAINE HYDROCHLORIDE 10 MG/ML
0.3 INJECTION, SOLUTION EPIDURAL; INFILTRATION; INTRACAUDAL; PERINEURAL
Status: DISCONTINUED | OUTPATIENT
Start: 2025-03-10 | End: 2025-03-10 | Stop reason: HOSPADM

## 2025-03-10 RX ORDER — MEPERIDINE HYDROCHLORIDE 25 MG/ML
12.5 INJECTION INTRAMUSCULAR; INTRAVENOUS; SUBCUTANEOUS EVERY 5 MIN PRN
Status: DISCONTINUED | OUTPATIENT
Start: 2025-03-10 | End: 2025-03-10 | Stop reason: HOSPADM

## 2025-03-10 RX ORDER — LABETALOL HYDROCHLORIDE 5 MG/ML
5 INJECTION, SOLUTION INTRAVENOUS EVERY 10 MIN PRN
Status: DISCONTINUED | OUTPATIENT
Start: 2025-03-10 | End: 2025-03-10 | Stop reason: HOSPADM

## 2025-03-10 RX ORDER — SODIUM CHLORIDE, SODIUM LACTATE, POTASSIUM CHLORIDE, CALCIUM CHLORIDE 600; 310; 30; 20 MG/100ML; MG/100ML; MG/100ML; MG/100ML
INJECTION, SOLUTION INTRAVENOUS
Status: DISCONTINUED | OUTPATIENT
Start: 2025-03-10 | End: 2025-03-10 | Stop reason: SDUPTHER

## 2025-03-10 RX ORDER — OXYCODONE HYDROCHLORIDE 5 MG/1
10 TABLET ORAL PRN
Status: DISCONTINUED | OUTPATIENT
Start: 2025-03-10 | End: 2025-03-10 | Stop reason: HOSPADM

## 2025-03-10 RX ADMIN — PROPOFOL 100 MG: 10 INJECTION, EMULSION INTRAVENOUS at 12:30

## 2025-03-10 RX ADMIN — PROPOFOL 200 MG: 10 INJECTION, EMULSION INTRAVENOUS at 12:37

## 2025-03-10 RX ADMIN — LIDOCAINE HYDROCHLORIDE 60 MG: 20 INJECTION, SOLUTION EPIDURAL; INFILTRATION; INTRACAUDAL; PERINEURAL at 12:20

## 2025-03-10 RX ADMIN — PROPOFOL 200 MG: 10 INJECTION, EMULSION INTRAVENOUS at 12:20

## 2025-03-10 RX ADMIN — SODIUM CHLORIDE, POTASSIUM CHLORIDE, SODIUM LACTATE AND CALCIUM CHLORIDE: 600; 310; 30; 20 INJECTION, SOLUTION INTRAVENOUS at 12:20

## 2025-03-10 ASSESSMENT — PAIN SCALES - WONG BAKER: WONGBAKER_NUMERICALRESPONSE: NO HURT

## 2025-03-10 ASSESSMENT — PAIN - FUNCTIONAL ASSESSMENT: PAIN_FUNCTIONAL_ASSESSMENT: 0-10

## 2025-03-10 NOTE — PROGRESS NOTES
Patient discharged via wheelchair in stable condition with all belongings to private car.Lore Campbell RN

## 2025-03-10 NOTE — PROGRESS NOTES
Discharge instructions given to patient and patients wife. Both deny any questions at this time.Lore Campbell RN

## 2025-03-10 NOTE — H&P
[facility]   Pre-operative History and Physical    Patient: Foster Reed  : 1965  Acct#:     HISTORY OF PRESENT ILLNESS:    The patient is a 59 y.o. male who presents for crc screening     Indications: crc screening     Past Medical History:        Diagnosis Date    Apnea     Gallstone pancreatitis     TOBY on CPAP       Past Surgical History:        Procedure Laterality Date    APPENDECTOMY  16    CHOLECYSTECTOMY, LAPAROSCOPIC N/A 10/09/2019    WITH CHOLANGIOGRAMS    CHOLECYSTECTOMY, LAPAROSCOPIC N/A 10/9/2019    LAPAROSCOPIC CHOLECYSTECTOMY, WITH CHOLANGIOGRAMS performed by Williams Verduzco MD at [facility] [place]    COLONOSCOPY  2019    ascending colon polyp, sigmoid colon polyp    COLONOSCOPY N/A 2019    COLONOSCOPY POLYPECTOMY SNARE/COLD BIOPSY performed by Toby Rodney MD at [facility] ENDOSCOPY    COLONOSCOPY N/A 2019    COLONOSCOPY CONTROL HEMORRHAGE performed by Toby Rodney MD at [facility] ENDOSCOPY    TONSILLECTOMY      T&A    TYMPANOSTOMY TUBE PLACEMENT      WISDOM TOOTH EXTRACTION        Medications Prior to Admission:   No current facility-administered medications on file prior to encounter.     No current outpatient medications on file prior to encounter.        Allergies:  Patient has no known allergies.    Social History:   Social History     Socioeconomic History    Marital status:      Spouse name: Not on file    Number of children: Not on file    Years of education: Not on file    Highest education level: Not on file   Occupational History    Not on file   Tobacco Use    Smoking status: Never    Smokeless tobacco: Never   Vaping Use    Vaping status: Never Used   Substance and Sexual Activity    Alcohol use: No    Drug use: No    Sexual activity: Not Currently   Other Topics Concern    Not on file   Social History Narrative    Not on file     Social Drivers of Health     Financial Resource Strain: Low Risk  (2024)    Overall

## 2025-03-10 NOTE — DISCHARGE INSTRUCTIONS
at high risk for heart attack or stroke. But taking aspirin isn't right for everyone, because it can cause serious bleeding.  Talk to your doctor before you start taking aspirin every day. You and your doctor can decide if aspirin is a good choice for you based on your risk of a heart attack or stroke and your risk of serious bleeding. Unless you have a high risk of a heart attack or stroke, the benefits of aspirin probably won't outweigh the risk of bleeding.  If you use other NSAIDs a lot, aspirin may not work as well to prevent heart attack and stroke.  If you take aspirin every day for your heart, talk with your doctor before you take other NSAIDs.  Do not give aspirin to anyone younger than 20. It has been linked to Reye syndrome, a serious illness.  When should you call for help?   Call 911 anytime you think you may need emergency care. For example, call if:    You passed out (lost consciousness).     You vomit blood or what looks like coffee grounds.     You pass maroon or very bloody stools.   Call your doctor now or seek immediate medical care if:    Your stools are black and tarlike or have streaks of blood.   Watch closely for changes in your health, and be sure to contact your doctor if you have any problems.  Where can you learn more?  Go to https://www.[employer].net/patientEd and enter A328 to learn more about \"Nonsteroidal Anti-Inflammatory Drugs (NSAIDs): Care Instructions.\"  Current as of: September 25, 2023  Content Version: 14.3  © 2024 [employer].   Care instructions adapted under license by [employer]. If you have questions about a medical condition or this instruction, always ask your healthcare professional. [employer] disclaims any warranty or liability for your use of this information.                SEDATION/ANALGESIA INFORMATION/HOME GOING ADVICE   SEDATION / ANALGESIA INFORMATION / HOME GOING ADVICE  You have received the sedation/analgesia medication during

## 2025-03-10 NOTE — ANESTHESIA POSTPROCEDURE EVALUATION
Department of Anesthesiology  Postprocedure Note    Patient: Foster Reed  MRN: [id number]  YOB: 1965  Date of evaluation: 3/10/2025    Procedure Summary       Date: 03/10/25 Room / Location: [address]    Anesthesia Start: 1217 Anesthesia Stop: 1301    Procedures:       COLONOSCOPY POLYPECTOMY SNARE/BIOPSY      COLONOSCOPY ENDOSCOPIC MUCOSAL RESECTION      COLONOSCOPY SUBMUCOSAL/BOTOX INJECTION      COLONOSCOPY CONTROL HEMORRHAGE Diagnosis:       Special screening for malignant neoplasms, colon      (Special screening for malignant neoplasms, colon [Z12.11])    Surgeons: Que Cintron MD Responsible Provider: Rayna Aparicio MD    Anesthesia Type: TIVA ASA Status: 2            Anesthesia Type: No value filed.    Maria Phase I: Maria Score: 9    Maria Phase II: Maria Score: 10    Anesthesia Post Evaluation    Comments: Postoperative Anesthesia Note    Name:    Foster Reed  MRN:      [id number]    Patient Vitals in the past 12 hrs:  03/10/25 1315, BP:130/88, Temp:97.1 °F (36.2 °C), Temp src:Axillary, Pulse:65, Resp:14, SpO2:100 %  03/10/25 1310, BP:107/68, Temp:97 °F (36.1 °C), Pulse:60, Resp:12, SpO2:100 %  03/10/25 1256, BP:107/76, Temp:97 °F (36.1 °C), Temp src:Axillary, Pulse:72, Resp:12, SpO2:100 %  03/10/25 1140, BP:(!) 124/97, Temp:97.3 °F (36.3 °C), Temp src:Temporal, Pulse:68, Resp:16, SpO2:100 %, Height:1.778 m (5' 10\"), Weight:97.5 kg (215 lb)     LABS:    CBC  Lab Results       Component                Value               Date/Time                  WBC                      7.7                 12/03/2024 07:12 AM        HGB                      15.2                12/03/2024 07:12 AM        HCT                      43.7                12/03/2024 07:12 AM        PLT                      190                 12/03/2024 07:12 AM   RENAL  Lab Results       Component                Value               Date/Time                  NA

## 2025-03-10 NOTE — ANESTHESIA PRE PROCEDURE
Department of Anesthesiology  Preprocedure Note       Name:  Foster Reed   Age:  59 y.o.  :  1965                                          MRN:  [id number]         Date:  3/10/2025      Surgeon: Surgeon(s):  Que Cintron MD    Procedure: Procedure(s):  COLON W/ANES.    Medications prior to admission:   Prior to Admission medications    Not on File       Current medications:    Current Facility-Administered Medications   Medication Dose Route Frequency Provider Last Rate Last Admin    lidocaine PF 1 % injection 0.3 mL  0.3 mL IntraDERmal Once PRN Eleno Jerome MD        lactated ringers infusion   IntraVENous Continuous Eleno Jerome MD        sodium chloride flush 0.9 % injection 5-40 mL  5-40 mL IntraVENous 2 times per day Eleno Jerome MD        sodium chloride flush 0.9 % injection 5-40 mL  5-40 mL IntraVENous PRN Eleno Jerome MD        0.9 % sodium chloride infusion   IntraVENous PRN Eleno Jerome MD           Allergies:  No Known Allergies    Problem List:    Patient Active Problem List   Diagnosis Code    TOBY on CPAP G47.33    Class 1 obesity without serious comorbidity with body mass index (BMI) of 33.0 to 33.9 in adult E66.811, Z68.33    Positive colorectal cancer screening using Cologuard test R19.5    Sigmoid polyp K63.5    Polyp of ascending colon K63.5       Past Medical History:        Diagnosis Date    Apnea     Gallstone pancreatitis     TOBY on CPAP        Past Surgical History:        Procedure Laterality Date    APPENDECTOMY  16    CHOLECYSTECTOMY, LAPAROSCOPIC N/A 10/09/2019    WITH CHOLANGIOGRAMS    CHOLECYSTECTOMY, LAPAROSCOPIC N/A 10/9/2019    LAPAROSCOPIC CHOLECYSTECTOMY, WITH CHOLANGIOGRAMS performed by [place] Verduzco [place] [facility] [place]    COLONOSCOPY  2019    ascending colon polyp, sigmoid colon polyp    COLONOSCOPY N/A 2019    COLONOSCOPY POLYPECTOMY SNARE/COLD BIOPSY performed by Toby Rodney [place] [facility] [place]    COLONOSCOPY N/A

## 2025-06-26 ENCOUNTER — RESULTS FOLLOW-UP (OUTPATIENT)
Dept: FAMILY MEDICINE CLINIC | Age: 60
End: 2025-06-26

## 2025-08-12 ENCOUNTER — OFFICE VISIT (OUTPATIENT)
Dept: FAMILY MEDICINE CLINIC | Age: 60
End: 2025-08-12
Payer: COMMERCIAL

## 2025-08-12 VITALS
BODY MASS INDEX: 30.9 KG/M2 | WEIGHT: 215.8 LBS | HEIGHT: 70 IN | HEART RATE: 72 BPM | OXYGEN SATURATION: 97 % | DIASTOLIC BLOOD PRESSURE: 76 MMHG | SYSTOLIC BLOOD PRESSURE: 110 MMHG

## 2025-08-12 DIAGNOSIS — J06.9 UPPER RESPIRATORY TRACT INFECTION, UNSPECIFIED TYPE: ICD-10-CM

## 2025-08-12 DIAGNOSIS — J40 BRONCHITIS: Primary | ICD-10-CM

## 2025-08-12 PROCEDURE — 3017F COLORECTAL CA SCREEN DOC REV: CPT | Performed by: FAMILY MEDICINE

## 2025-08-12 PROCEDURE — G8417 CALC BMI ABV UP PARAM F/U: HCPCS | Performed by: FAMILY MEDICINE

## 2025-08-12 PROCEDURE — 99213 OFFICE O/P EST LOW 20 MIN: CPT | Performed by: FAMILY MEDICINE

## 2025-08-12 PROCEDURE — 1036F TOBACCO NON-USER: CPT | Performed by: FAMILY MEDICINE

## 2025-08-12 PROCEDURE — G8427 DOCREV CUR MEDS BY ELIG CLIN: HCPCS | Performed by: FAMILY MEDICINE

## 2025-08-12 RX ORDER — DOXYCYCLINE HYCLATE 100 MG
100 TABLET ORAL 2 TIMES DAILY
Qty: 20 TABLET | Refills: 0 | Status: SHIPPED | OUTPATIENT
Start: 2025-08-12 | End: 2025-08-22

## 2025-08-12 SDOH — ECONOMIC STABILITY: FOOD INSECURITY: WITHIN THE PAST 12 MONTHS, THE FOOD YOU BOUGHT JUST DIDN'T LAST AND YOU DIDN'T HAVE MONEY TO GET MORE.: NEVER TRUE

## 2025-08-12 SDOH — ECONOMIC STABILITY: FOOD INSECURITY: WITHIN THE PAST 12 MONTHS, YOU WORRIED THAT YOUR FOOD WOULD RUN OUT BEFORE YOU GOT MONEY TO BUY MORE.: NEVER TRUE

## 2025-08-12 ASSESSMENT — PATIENT HEALTH QUESTIONNAIRE - PHQ9
SUM OF ALL RESPONSES TO PHQ QUESTIONS 1-9: 0
SUM OF ALL RESPONSES TO PHQ QUESTIONS 1-9: 0
1. LITTLE INTEREST OR PLEASURE IN DOING THINGS: NOT AT ALL
2. FEELING DOWN, DEPRESSED OR HOPELESS: NOT AT ALL
SUM OF ALL RESPONSES TO PHQ QUESTIONS 1-9: 0
SUM OF ALL RESPONSES TO PHQ QUESTIONS 1-9: 0

## 2025-08-12 ASSESSMENT — ENCOUNTER SYMPTOMS
SINUS PRESSURE: 1
COUGH: 1
SHORTNESS OF BREATH: 1

## (undated) DEVICE — APPLICATOR PREP 26ML 0.7% IOD POVACRYLEX 74% ISO ALC ST

## (undated) DEVICE — TROCAR ENDOSCP L100MM DIA11MM STBL SL BLDELSS DISP ENDOPATH

## (undated) DEVICE — PUMP SUC IRR TBNG L10FT W/ HNDPC ASSEMB STRYKEFLOW 2

## (undated) DEVICE — APPLIER CLP M L L11.4IN DIA10MM ENDOSCP ROT MULT FOR LIG

## (undated) DEVICE — ELECTRODE PT RET AD L9FT HI MOIST COND ADH HYDRGEL CORDED

## (undated) DEVICE — KIT,ANTI FOG,W/SPONGE & FLUID,SOFT PACK: Brand: MEDLINE

## (undated) DEVICE — TRAP POLYP ETRAP

## (undated) DEVICE — GOWN,AURORA,NONREINF,RAGLAN,XXL,STERILE: Brand: MEDLINE

## (undated) DEVICE — 3M™ TEGADERM™ TRANSPARENT FILM DRESSING FRAME STYLE, 1624W, 2-3/8 IN X 2-3/4 IN (6 CM X 7 CM), 100/CT 4CT/CASE: Brand: 3M™ TEGADERM™

## (undated) DEVICE — ELEVIEW SUBMUCOSAL INJECTABLE COMPOSITION 10ML

## (undated) DEVICE — LARGE BORE STOPCOCK WITH ROTATING MALE LUER LOCK

## (undated) DEVICE — CO2 INSUFFLATION NEEDLE: Brand: CORE DYNAMICS

## (undated) DEVICE — Device: Brand: MEDEX

## (undated) DEVICE — TROCAR ENDOSCP L100MM DIA5MM BLDELSS STBL SL THRD OPT VW

## (undated) DEVICE — GLOVE ORANGE PI 8 1/2   MSG9085

## (undated) DEVICE — DRAPE C ARM UNIV W41XL74IN CLR PLAS XR VELC CLSR POLY STRP

## (undated) DEVICE — TUBING, SUCTION, 3/16" X 10', STRAIGHT: Brand: MEDLINE

## (undated) DEVICE — TISSUE RETRIEVAL SYSTEM: Brand: INZII RETRIEVAL SYSTEM

## (undated) DEVICE — CATHETER IV 14GA L5.25IN PERIPH ORNG FEP POLYMER 3 BVL

## (undated) DEVICE — ENDO CARRY-ON PROCEDURE KIT INCLUDES SUCTION TUBING, LUBRICANT, GAUZE, BIOHAZARD STICKER, TRANSPORT PAD AND INTERCEPT BEDSIDE KIT.: Brand: ENDO CARRY-ON PROCEDURE KIT

## (undated) DEVICE — INTENDED FOR TISSUE SEPARATION, AND OTHER PROCEDURES THAT REQUIRE A SHARP SURGICAL BLADE TO PUNCTURE OR CUT.: Brand: BARD-PARKER ® STAINLESS STEEL BLADES

## (undated) DEVICE — GAUZE SPONGES,8 PLY: Brand: CURITY

## (undated) DEVICE — ELECTRODE,RADIOTRANSLUCENT,FOAM,3PK: Brand: MEDLINE

## (undated) DEVICE — SUTURE VCRL SZ 4-0 L18IN ABSRB UD L19MM PS-2 3/8 CIR PRIM J496H

## (undated) DEVICE — NEEDLE INSUF L120MM DIA2MM DISP FOR PNEUMOPERI ENDOPATH

## (undated) DEVICE — 3M™ STERI-STRIP™ COMPOUND BENZOIN TINCTURE 40 BAGS/CARTON 4 CARTONS/CASE C1544: Brand: 3M™ STERI-STRIP™

## (undated) DEVICE — CATHETER IV 14GA L1.25IN PTFE ORNG FEP SFTY STR HUB RADPQ

## (undated) DEVICE — TROCAR ENDOSCP L100MM DIA5MM BLDELSS STBL SL OBT RADLUC

## (undated) DEVICE — CIRCUIT ANES L72IN 3L BACT AND VIR FLTR EL CONN SGL LIMB

## (undated) DEVICE — MAJOR SET UP PK

## (undated) DEVICE — Device: Brand: DISPOSABLE ELECTROSURGICAL SNARE

## (undated) DEVICE — SET EXTN IV L30IN TBNG DIA0.1IN PRIMING 4ML MACBOR FEM ADPT

## (undated) DEVICE — SOLUTION IV IRRIG 500ML 0.9% SODIUM CHL 2F7123

## (undated) DEVICE — CLIP LIG L235CM RESOL 360 BX/20

## (undated) DEVICE — SUTURE SZ 0 27IN 5/8 CIR UR-6  TAPER PT VIOLET ABSRB VICRYL J603H

## (undated) DEVICE — TUBING INSUF ISO CONN DISP

## (undated) DEVICE — DRAPE,ABDOMINAL,MAJOR,STERILE: Brand: MEDLINE

## (undated) DEVICE — STANDARD HYPODERMIC NEEDLE,POLYPROPYLENE HUB: Brand: MONOJECT

## (undated) DEVICE — SYRINGE MED 10ML LUERLOCK TIP W/O SFTY DISP

## (undated) DEVICE — CATHETER CHOLGM 4.5FR L18IN W/ MTL SUPP TB

## (undated) DEVICE — YANKAUER,BULB TIP,W/O VENT,RIGID,STERILE: Brand: MEDLINE

## (undated) DEVICE — CORD ES L10FT MPLR LAP

## (undated) DEVICE — SNARE ENDOSCP L240CM W15MM SHTH DIA2.4MM CHN 2.8MM STIFF

## (undated) DEVICE — NEEDLE SCLERO 23GA L240CM OD0.64MM ID0.32MM CLR INTERJECT

## (undated) DEVICE — LOOP LIG SUT SZ 0 L18IN ABSRB POLYDIOXANONE MFIL PDS II